# Patient Record
Sex: FEMALE | Race: WHITE | NOT HISPANIC OR LATINO | Employment: OTHER | URBAN - NONMETROPOLITAN AREA
[De-identification: names, ages, dates, MRNs, and addresses within clinical notes are randomized per-mention and may not be internally consistent; named-entity substitution may affect disease eponyms.]

---

## 2023-05-01 ENCOUNTER — APPOINTMENT (OUTPATIENT)
Dept: PRIMARY CARE | Facility: CLINIC | Age: 63
End: 2023-05-01
Payer: COMMERCIAL

## 2023-05-03 ENCOUNTER — APPOINTMENT (OUTPATIENT)
Dept: PRIMARY CARE | Facility: CLINIC | Age: 63
End: 2023-05-03
Payer: COMMERCIAL

## 2023-05-06 LAB
ALANINE AMINOTRANSFERASE (SGPT) (U/L) IN SER/PLAS: 17 U/L (ref 7–45)
ALBUMIN (G/DL) IN SER/PLAS: 4.4 G/DL (ref 3.4–5)
ALKALINE PHOSPHATASE (U/L) IN SER/PLAS: 69 U/L (ref 33–136)
ANION GAP IN SER/PLAS: 9 MMOL/L (ref 10–20)
ASPARTATE AMINOTRANSFERASE (SGOT) (U/L) IN SER/PLAS: 18 U/L (ref 9–39)
BASOPHILS (10*3/UL) IN BLOOD BY AUTOMATED COUNT: 0.12 X10E9/L (ref 0–0.1)
BASOPHILS/100 LEUKOCYTES IN BLOOD BY AUTOMATED COUNT: 2 % (ref 0–2)
BILIRUBIN TOTAL (MG/DL) IN SER/PLAS: 0.5 MG/DL (ref 0–1.2)
CALCIUM (MG/DL) IN SER/PLAS: 9.2 MG/DL (ref 8.6–10.3)
CARBON DIOXIDE, TOTAL (MMOL/L) IN SER/PLAS: 31 MMOL/L (ref 21–32)
CHLORIDE (MMOL/L) IN SER/PLAS: 106 MMOL/L (ref 98–107)
CHOLESTEROL (MG/DL) IN SER/PLAS: 216 MG/DL (ref 0–199)
CHOLESTEROL IN HDL (MG/DL) IN SER/PLAS: 67 MG/DL
CHOLESTEROL/HDL RATIO: 3.2
CREATININE (MG/DL) IN SER/PLAS: 0.8 MG/DL (ref 0.5–1.05)
EOSINOPHILS (10*3/UL) IN BLOOD BY AUTOMATED COUNT: 0.1 X10E9/L (ref 0–0.7)
EOSINOPHILS/100 LEUKOCYTES IN BLOOD BY AUTOMATED COUNT: 1.7 % (ref 0–6)
ERYTHROCYTE DISTRIBUTION WIDTH (RATIO) BY AUTOMATED COUNT: 13.6 % (ref 11.5–14.5)
ERYTHROCYTE MEAN CORPUSCULAR HEMOGLOBIN CONCENTRATION (G/DL) BY AUTOMATED: 33.4 G/DL (ref 32–36)
ERYTHROCYTE MEAN CORPUSCULAR VOLUME (FL) BY AUTOMATED COUNT: 88 FL (ref 80–100)
ERYTHROCYTES (10*6/UL) IN BLOOD BY AUTOMATED COUNT: 5 X10E12/L (ref 4–5.2)
GFR FEMALE: 83 ML/MIN/1.73M2
GLUCOSE (MG/DL) IN SER/PLAS: 78 MG/DL (ref 74–99)
HEMATOCRIT (%) IN BLOOD BY AUTOMATED COUNT: 44 % (ref 36–46)
HEMOGLOBIN (G/DL) IN BLOOD: 14.7 G/DL (ref 12–16)
IMMATURE GRANULOCYTES/100 LEUKOCYTES IN BLOOD BY AUTOMATED COUNT: 0.2 % (ref 0–0.9)
LDL: 133 MG/DL (ref 0–99)
LEUKOCYTES (10*3/UL) IN BLOOD BY AUTOMATED COUNT: 5.9 X10E9/L (ref 4.4–11.3)
LYMPHOCYTES (10*3/UL) IN BLOOD BY AUTOMATED COUNT: 0.96 X10E9/L (ref 1.2–4.8)
LYMPHOCYTES/100 LEUKOCYTES IN BLOOD BY AUTOMATED COUNT: 16.3 % (ref 13–44)
MONOCYTES (10*3/UL) IN BLOOD BY AUTOMATED COUNT: 0.41 X10E9/L (ref 0.1–1)
MONOCYTES/100 LEUKOCYTES IN BLOOD BY AUTOMATED COUNT: 6.9 % (ref 2–10)
NEUTROPHILS (10*3/UL) IN BLOOD BY AUTOMATED COUNT: 4.3 X10E9/L (ref 1.2–7.7)
NEUTROPHILS/100 LEUKOCYTES IN BLOOD BY AUTOMATED COUNT: 72.9 % (ref 40–80)
PLATELETS (10*3/UL) IN BLOOD AUTOMATED COUNT: 171 X10E9/L (ref 150–450)
POTASSIUM (MMOL/L) IN SER/PLAS: 3.5 MMOL/L (ref 3.5–5.3)
PROTEIN TOTAL: 6.8 G/DL (ref 6.4–8.2)
SODIUM (MMOL/L) IN SER/PLAS: 142 MMOL/L (ref 136–145)
TRIGLYCERIDE (MG/DL) IN SER/PLAS: 82 MG/DL (ref 0–149)
UREA NITROGEN (MG/DL) IN SER/PLAS: 20 MG/DL (ref 6–23)
VLDL: 16 MG/DL (ref 0–40)

## 2023-05-10 ENCOUNTER — OFFICE VISIT (OUTPATIENT)
Dept: PRIMARY CARE | Facility: CLINIC | Age: 63
End: 2023-05-10
Payer: COMMERCIAL

## 2023-05-10 VITALS
WEIGHT: 134 LBS | HEART RATE: 66 BPM | DIASTOLIC BLOOD PRESSURE: 72 MMHG | OXYGEN SATURATION: 98 % | BODY MASS INDEX: 21.53 KG/M2 | HEIGHT: 66 IN | SYSTOLIC BLOOD PRESSURE: 120 MMHG

## 2023-05-10 DIAGNOSIS — D32.9 MENINGIOMA (MULTI): ICD-10-CM

## 2023-05-10 DIAGNOSIS — E78.2 MODERATE MIXED HYPERLIPIDEMIA NOT REQUIRING STATIN THERAPY: ICD-10-CM

## 2023-05-10 DIAGNOSIS — M79.642 HAND PAIN, LEFT: Primary | ICD-10-CM

## 2023-05-10 DIAGNOSIS — I10 PRIMARY HYPERTENSION: ICD-10-CM

## 2023-05-10 DIAGNOSIS — F41.9 ANXIETY: ICD-10-CM

## 2023-05-10 PROBLEM — N81.9 PROLAPSE OF FEMALE PELVIC ORGANS: Status: ACTIVE | Noted: 2023-05-10

## 2023-05-10 PROBLEM — N39.0 RECURRENT UTI: Status: ACTIVE | Noted: 2023-05-10

## 2023-05-10 PROBLEM — I34.0 MITRAL REGURGITATION: Status: ACTIVE | Noted: 2023-05-10

## 2023-05-10 PROBLEM — H91.93 BILATERAL HEARING LOSS: Status: ACTIVE | Noted: 2023-05-10

## 2023-05-10 PROBLEM — I77.819 AORTIC ECTASIA (CMS-HCC): Status: ACTIVE | Noted: 2023-05-10

## 2023-05-10 PROBLEM — Z90.710 HISTORY OF HYSTERECTOMY: Status: ACTIVE | Noted: 2023-05-10

## 2023-05-10 PROBLEM — M54.2 CERVICALGIA: Status: ACTIVE | Noted: 2023-05-10

## 2023-05-10 PROCEDURE — 1036F TOBACCO NON-USER: CPT | Performed by: FAMILY MEDICINE

## 2023-05-10 PROCEDURE — 3074F SYST BP LT 130 MM HG: CPT | Performed by: FAMILY MEDICINE

## 2023-05-10 PROCEDURE — 3078F DIAST BP <80 MM HG: CPT | Performed by: FAMILY MEDICINE

## 2023-05-10 PROCEDURE — 99214 OFFICE O/P EST MOD 30 MIN: CPT | Performed by: FAMILY MEDICINE

## 2023-05-10 RX ORDER — PERPHENAZINE 4 MG
TABLET ORAL
COMMUNITY

## 2023-05-10 RX ORDER — CALCIUM CARBONATE 600 MG
TABLET ORAL
COMMUNITY
End: 2024-05-14 | Stop reason: ALTCHOICE

## 2023-05-10 RX ORDER — ESTRADIOL 0.1 MG/G
2 CREAM VAGINAL DAILY
COMMUNITY
Start: 2019-08-18 | End: 2024-01-12 | Stop reason: SDUPTHER

## 2023-05-10 RX ORDER — DEXAMETHASONE 4 MG/1
2 TABLET ORAL 2 TIMES DAILY
COMMUNITY
End: 2023-07-17

## 2023-05-10 RX ORDER — MULTIVITAMIN
TABLET ORAL
COMMUNITY

## 2023-05-10 RX ORDER — GINSENG 100 MG
CAPSULE ORAL
COMMUNITY

## 2023-05-10 RX ORDER — MV-MN/C/THEANINE/HERB NO.310 1000-200MG
POWDER IN PACKET (EA) ORAL
COMMUNITY

## 2023-05-10 NOTE — PROGRESS NOTES
"Subjective   Patient ID: Mayra Mcclure is a 62 y.o. female who presents yearly check up; lab and med review; c/o left hand pain.      HPI    Review of Systems    Objective   Vitals:    05/10/23 0815   BP: 120/72   BP Location: Left arm   Pulse: 66   SpO2: 98%   Weight: 60.8 kg (134 lb)   Height: 1.676 m (5' 6\")      Physical Exam    Assessment/Plan   There are no diagnoses linked to this encounter.      "

## 2023-05-10 NOTE — PATIENT INSTRUCTIONS
Will check xray of joint in question at thumb, consider hand referral.  Call concerns . Routine followup annually, labs prior.

## 2023-05-10 NOTE — PROGRESS NOTES
Patient presents for periodic surveillance of chronic medical problems.     Subjective  Mayra Mcclure is a 62 y.o. female who presents for No chief complaint on file..  HPI  Has had left hand pain for a year .  Using voltaren gel and otc analgesics, pain is base of thumb, worse with writing.  Left hand dominant.   Has been to neurosurg and has MRI in a year to recheck meningioma.   Has been to cardiology for mitral regurg/aortic ectasia  Htn, stable  Anxiety stable  Chronic neck pain, uses muscle relaxant prn, reviewed side effects  Mammogram and colon cancer screening current.  Denies other problems or concerns.  Review of Systems   All other systems reviewed and are negative.  .    Objective     Visit Vitals  /72 (BP Location: Left arm)   Pulse 66      Physical Exam  Vitals and nursing note reviewed.   Constitutional:       General: She is not in acute distress.     Appearance: Normal appearance. She is not toxic-appearing.   HENT:      Head: Normocephalic and atraumatic.      Right Ear: External ear normal.   Cardiovascular:      Rate and Rhythm: Normal rate and regular rhythm.      Heart sounds: No murmur heard.  Pulmonary:      Effort: Pulmonary effort is normal.      Breath sounds: Normal breath sounds.   Musculoskeletal:      Cervical back: No rigidity.      Comments: Normal gait   Skin:     General: Skin is warm and dry.   Neurological:      General: No focal deficit present.      Mental Status: She is alert and oriented to person, place, and time.   Psychiatric:         Mood and Affect: Mood normal.         Behavior: Behavior normal.         Assessment/Plan   Problem List Items Addressed This Visit          Nervous    Meningioma (CMS/HCC)       Circulatory    Hypertension    Relevant Orders    CBC and Auto Differential    Comprehensive Metabolic Panel    Lipid Panel    TSH with reflex to Free T4 if abnormal       Other    Anxiety     Other Visit Diagnoses       Hand pain, left    -  Primary     Relevant Orders    XR hand left 3+ views    Moderate mixed hyperlipidemia not requiring statin therapy        Relevant Orders    CBC and Auto Differential    Comprehensive Metabolic Panel    Lipid Panel    TSH with reflex to Free T4 if abnormal                   Bee Valenzuela MD

## 2023-06-02 DIAGNOSIS — Z00.00 ENCOUNTER FOR GENERAL ADULT MEDICAL EXAMINATION WITHOUT ABNORMAL FINDINGS: ICD-10-CM

## 2023-06-02 RX ORDER — DILTIAZEM HYDROCHLORIDE 60 MG/1
60 TABLET, FILM COATED ORAL 2 TIMES DAILY
Qty: 180 TABLET | Refills: 1 | Status: SHIPPED | OUTPATIENT
Start: 2023-06-02 | End: 2023-09-07 | Stop reason: SDUPTHER

## 2023-07-01 LAB
APPEARANCE, URINE: CLEAR
BILIRUBIN, URINE: NEGATIVE
BLOOD, URINE: NEGATIVE
COLOR, URINE: YELLOW
GLUCOSE, URINE: NEGATIVE MG/DL
KETONES, URINE: NEGATIVE MG/DL
LEUKOCYTE ESTERASE, URINE: NEGATIVE
NITRITE, URINE: NEGATIVE
PH, URINE: 5 (ref 5–8)
PROTEIN, URINE: NEGATIVE MG/DL
SPECIFIC GRAVITY, URINE: 1.02 (ref 1–1.03)
UROBILINOGEN, URINE: <2 MG/DL (ref 0–1.9)

## 2023-07-12 DIAGNOSIS — Z00.00 ENCOUNTER FOR GENERAL ADULT MEDICAL EXAMINATION WITHOUT ABNORMAL FINDINGS: ICD-10-CM

## 2023-07-17 RX ORDER — DEXAMETHASONE 4 MG/1
TABLET ORAL
Qty: 12 G | Refills: 3 | Status: SHIPPED | OUTPATIENT
Start: 2023-07-17 | End: 2024-01-12 | Stop reason: SDUPTHER

## 2023-09-07 DIAGNOSIS — Z86.59 PERSONAL HISTORY OF OTHER MENTAL AND BEHAVIORAL DISORDERS: ICD-10-CM

## 2023-09-07 DIAGNOSIS — Z00.00 ENCOUNTER FOR GENERAL ADULT MEDICAL EXAMINATION WITHOUT ABNORMAL FINDINGS: ICD-10-CM

## 2023-09-07 RX ORDER — DILTIAZEM HYDROCHLORIDE 60 MG/1
60 TABLET, FILM COATED ORAL 2 TIMES DAILY
Qty: 180 TABLET | Refills: 1 | Status: SHIPPED | OUTPATIENT
Start: 2023-09-07 | End: 2024-02-12 | Stop reason: SDUPTHER

## 2023-09-07 RX ORDER — SERTRALINE HYDROCHLORIDE 100 MG/1
100 TABLET, FILM COATED ORAL DAILY
Qty: 90 TABLET | Refills: 1 | Status: SHIPPED | OUTPATIENT
Start: 2023-09-07 | End: 2024-01-12 | Stop reason: SDUPTHER

## 2023-10-30 ENCOUNTER — PHARMACY VISIT (OUTPATIENT)
Dept: PHARMACY | Facility: CLINIC | Age: 63
End: 2023-10-30
Payer: COMMERCIAL

## 2023-10-30 PROCEDURE — RXMED WILLOW AMBULATORY MEDICATION CHARGE

## 2023-11-01 NOTE — PROGRESS NOTES
Mayra Mcclure female   1960 62 y.o.   [id number]      Chief Complaint  Annual mammogram and exam    History Of Present Illness  Araceli Mcclure is a 62 y.o.  female [facility] and exam. She had a right breast core biopsy in 2022, benign and discordant. It was unclear if the clip correlates with the original mammogram finding.     BREAST IMAGIN2023 Right diagnostic mammogram and ultrasound, indicates BI-RADS Category 2.     REPRODUCTIVE HISTORY: menarche age 11, , first birth age, , no OCP's, menopause age 60, HRT, heterogeneously dense tissue     FAMILY CANCER HISTORY:   None    Surgical History  She has a past surgical history that includes Other surgical history (2019); Other surgical history (2019); Other surgical history (2019); Other surgical history (2019); and Other surgical history (2019).     Social History  She reports that she has never smoked. She has never used smokeless tobacco. No history on file for alcohol use and drug use.    Family History  No family history on file.     Allergies  Patient has no known allergies.    Medications  Current Outpatient Medications   Medication Instructions    calcium carbonate 600 mg calcium (1,500 mg) tablet oral    coffee xt-phosphatidyl serine (Neuriva Original) 100-100 mg capsule oral    collagen-biotin-ascorbic acid (Collagen 1500 Plus C) 500 mg-800 mcg- 50 mg capsule oral    cranberry extract 200 mg capsule oral    dilTIAZem (CARDIZEM) 60 mg, oral, 2 times daily    estradiol (ESTRACE) 2 g, vaginal, Daily    fish oil (Omega-3) 60- mg capsule Fish Oil OIL   Refills: 0       Active    Flovent  mcg/actuation inhaler TAKE 2 PUFFS BY MOUTH TWICE A DAY    multivitamin tablet oral    orphenadrine (Norflex) 100 mg 12 hr tablet TAKE 1 TABLET BY MOUTH TWICE A DAY AS NEEDED FOR SPASMS    sertraline (Zoloft) 100 mg tablet TAKE 1 TABLET (100 MG)  BY MOUTH ONCE DAILY.    sertraline (ZOLOFT) 100 mg, oral, Daily         REVIEW OF SYSTEMS    Constitutional:  Negative for appetite change, fatigue, fever and unexpected weight change.   HENT:  Negative for ear pain, hearing loss, nosebleeds, sore throat and trouble swallowing.    Eyes:  Negative for discharge, itching and visual disturbance.   Respiratory:  Negative for cough, chest tightness and shortness of breath.    Cardiovascular:  Negative for chest pain, palpitations and leg swelling.   Breast: as indicated in HPI  Gastrointestinal:  Negative for abdominal pain, constipation, diarrhea and nausea.   Endocrine: Negative for cold intolerance and heat intolerance.   Genitourinary:  Negative for dysuria, frequency, hematuria, pelvic pain and vaginal bleeding.   Musculoskeletal:  Negative for arthralgias, back pain, gait problem, joint swelling and myalgias.   Skin:  Negative for color change and rash.   Allergic/Immunologic: Negative for environmental allergies and food allergies.   Neurological:  Negative for dizziness, tremors, speech difficulty, weakness, numbness and headaches.   Hematological:  Does not bruise/bleed easily.   Psychiatric/Behavioral:  Negative for agitation, dysphoric mood and sleep disturbance. The patient is not nervous/anxious.         Past Medical History  She has a past medical history of Anxiety disorder, unspecified (01/06/2023), Cervicalgia (01/06/2023), Encounter for full-term uncomplicated delivery, Essential (primary) hypertension (01/06/2023), Personal history of other mental and behavioral disorders (05/02/2022), and Unspecified hearing loss, bilateral (05/02/2022).     Physical Exam  Patient is alert and oriented x3 and in a relaxed and appropriate mood. Her gait is steady and hand grasps are equal. Sclera is clear. The breasts are nearly symmetrical. The tissue is soft without palpable abnormalities, discrete nodules or masses. The skin and nipples appear normal. There is no  cervical, supraclavicular or axillary lymphadenopathy. Heart rate and rhythm normal, S1 and S2 appreciated. The lungs are clear to auscultation bilaterally. Abdomen is soft and non-tender.       Physical Exam     Last Recorded Vitals  There were no vitals filed for this visit.    Relevant Results   Time was spent viewing digital images of the radiology testing with the patient. I explained the results in depth, along with suggested explanation for follow up recommendations based on the testing results. BI-RADS Category ***    Imaging      Assessment/Plan   Normal clinical exam and imaging, history of right breast benign biopsy, no family history of breast cancer, heterogeneously dense tissue    Plan: Return to PCP for annual mammograms.     Patient Discussion/Summary  Your clinical examination and imaging are normal. You no longer need to be seen by a breast specialist for an annual physical breast examination. It is important to continue annual screening mammograms and breast exams through your primary care provider. Please return to see me if you have a new breast problem or abnormal mammogram. It has been a pleasure having you as a patient.     You can see your health information, review clinical summaries from office visits & test results online when you follow your health with MY  Chart, a personal health record. To sign up go to [email or web address]. If you need assistance with signing up or trouble getting into your account call [facility] 24/7 at [phone].    My office phone number is [phone] if you need to get in touch with me or have additional questions or concerns. Thank you for choosing [facility] and trusting me as your healthcare provider. I am honored to be a provider on your health care team and I remain dedicated to helping you achieve your health goals.   Jennifer Forbes, APRN-CNP

## 2023-11-10 RX ORDER — ARTIFICIAL TEARS 1; 2; 3 MG/ML; MG/ML; MG/ML
SOLUTION/ DROPS OPHTHALMIC 4 TIMES DAILY
COMMUNITY

## 2023-11-10 RX ORDER — POLYETHYLENE GLYCOL 3350 17 G/17G
17 POWDER, FOR SOLUTION ORAL
COMMUNITY
Start: 2022-02-08

## 2023-11-10 RX ORDER — AMOXICILLIN 250 MG/1
1 CAPSULE ORAL 2 TIMES DAILY
COMMUNITY
Start: 2023-04-08 | End: 2024-01-12 | Stop reason: ALTCHOICE

## 2023-11-10 RX ORDER — ACETAMINOPHEN 500 MG
1 TABLET ORAL DAILY
COMMUNITY

## 2023-11-10 RX ORDER — GLUCOSAMINE/CHONDRO SU A 500-400 MG
1 TABLET ORAL 3 TIMES DAILY
COMMUNITY

## 2023-11-10 RX ORDER — IBUPROFEN 600 MG/1
600 TABLET ORAL 3 TIMES DAILY PRN
COMMUNITY
Start: 2023-07-12 | End: 2024-05-14 | Stop reason: ALTCHOICE

## 2023-11-10 RX ORDER — COVID-19 ANTIGEN TEST
KIT MISCELLANEOUS
COMMUNITY
Start: 2023-09-06 | End: 2024-05-14 | Stop reason: ALTCHOICE

## 2023-11-13 ENCOUNTER — APPOINTMENT (OUTPATIENT)
Dept: RADIOLOGY | Facility: CLINIC | Age: 63
End: 2023-11-13
Payer: COMMERCIAL

## 2023-11-13 ENCOUNTER — APPOINTMENT (OUTPATIENT)
Dept: SURGICAL ONCOLOGY | Facility: CLINIC | Age: 63
End: 2023-11-13
Payer: COMMERCIAL

## 2023-11-24 ENCOUNTER — PHARMACY VISIT (OUTPATIENT)
Dept: PHARMACY | Facility: CLINIC | Age: 63
End: 2023-11-24
Payer: COMMERCIAL

## 2023-11-24 PROCEDURE — RXMED WILLOW AMBULATORY MEDICATION CHARGE

## 2023-12-27 PROCEDURE — RXMED WILLOW AMBULATORY MEDICATION CHARGE

## 2023-12-28 ENCOUNTER — PHARMACY VISIT (OUTPATIENT)
Dept: PHARMACY | Facility: CLINIC | Age: 63
End: 2023-12-28
Payer: COMMERCIAL

## 2024-01-02 ENCOUNTER — TELEPHONE (OUTPATIENT)
Dept: PRIMARY CARE | Facility: CLINIC | Age: 64
End: 2024-01-02
Payer: COMMERCIAL

## 2024-01-02 NOTE — TELEPHONE ENCOUNTER
"KELLY stating she is going on a \"viking cruise\" in Feb. 2024; has a medical clearance form that needs filled out prior to the cruise. Wonders if TERRY needs to see her before filling out; states it just needs to say she is healthy and physically fit for the cruise. Please advise.  "

## 2024-01-12 ENCOUNTER — TELEPHONE (OUTPATIENT)
Dept: PRIMARY CARE | Facility: CLINIC | Age: 64
End: 2024-01-12

## 2024-01-12 ENCOUNTER — OFFICE VISIT (OUTPATIENT)
Dept: PRIMARY CARE | Facility: CLINIC | Age: 64
End: 2024-01-12
Payer: COMMERCIAL

## 2024-01-12 VITALS
HEART RATE: 61 BPM | OXYGEN SATURATION: 97 % | BODY MASS INDEX: 21.7 KG/M2 | WEIGHT: 135 LBS | DIASTOLIC BLOOD PRESSURE: 82 MMHG | SYSTOLIC BLOOD PRESSURE: 118 MMHG

## 2024-01-12 DIAGNOSIS — M79.641 BILATERAL HAND PAIN: ICD-10-CM

## 2024-01-12 DIAGNOSIS — Z00.00 ENCOUNTER FOR GENERAL ADULT MEDICAL EXAMINATION WITHOUT ABNORMAL FINDINGS: ICD-10-CM

## 2024-01-12 DIAGNOSIS — M54.2 CERVICALGIA: ICD-10-CM

## 2024-01-12 DIAGNOSIS — M79.642 BILATERAL HAND PAIN: ICD-10-CM

## 2024-01-12 DIAGNOSIS — F41.9 ANXIETY: ICD-10-CM

## 2024-01-12 DIAGNOSIS — D32.9 MENINGIOMA (MULTI): ICD-10-CM

## 2024-01-12 DIAGNOSIS — N39.0 RECURRENT UTI: Primary | ICD-10-CM

## 2024-01-12 DIAGNOSIS — R05.8 ALLERGIC COUGH: ICD-10-CM

## 2024-01-12 DIAGNOSIS — Z86.59 PERSONAL HISTORY OF OTHER MENTAL AND BEHAVIORAL DISORDERS: ICD-10-CM

## 2024-01-12 PROCEDURE — 3079F DIAST BP 80-89 MM HG: CPT | Performed by: FAMILY MEDICINE

## 2024-01-12 PROCEDURE — 99214 OFFICE O/P EST MOD 30 MIN: CPT | Performed by: FAMILY MEDICINE

## 2024-01-12 PROCEDURE — 1036F TOBACCO NON-USER: CPT | Performed by: FAMILY MEDICINE

## 2024-01-12 PROCEDURE — 3074F SYST BP LT 130 MM HG: CPT | Performed by: FAMILY MEDICINE

## 2024-01-12 RX ORDER — ALBUTEROL SULFATE 90 UG/1
2 AEROSOL, METERED RESPIRATORY (INHALATION) EVERY 4 HOURS PRN
Qty: 8 G | Refills: 5 | Status: SHIPPED | OUTPATIENT
Start: 2024-01-12 | End: 2025-01-11

## 2024-01-12 RX ORDER — SERTRALINE HYDROCHLORIDE 100 MG/1
100 TABLET, FILM COATED ORAL
Qty: 30 TABLET | Refills: 5 | Status: SHIPPED | OUTPATIENT
Start: 2024-01-12 | End: 2025-01-11

## 2024-01-12 RX ORDER — ESTRADIOL 0.1 MG/G
2 CREAM VAGINAL DAILY
Qty: 42.5 G | Status: CANCELLED | OUTPATIENT
Start: 2024-01-12

## 2024-01-12 RX ORDER — ESTRADIOL 0.1 MG/G
CREAM VAGINAL
Qty: 42.5 G | Refills: 1 | Status: SHIPPED | OUTPATIENT
Start: 2024-01-12

## 2024-01-12 RX ORDER — SERTRALINE HYDROCHLORIDE 100 MG/1
100 TABLET, FILM COATED ORAL DAILY
Qty: 90 TABLET | Refills: 1 | Status: SHIPPED | OUTPATIENT
Start: 2024-01-12 | End: 2024-01-12 | Stop reason: SDUPTHER

## 2024-01-12 RX ORDER — FLUTICASONE PROPIONATE 110 UG/1
2 AEROSOL, METERED RESPIRATORY (INHALATION) 2 TIMES DAILY
Qty: 12 G | Refills: 3 | Status: SHIPPED | OUTPATIENT
Start: 2024-01-12

## 2024-01-12 RX ORDER — ORPHENADRINE CITRATE 100 MG/1
TABLET, EXTENDED RELEASE ORAL
Qty: 30 TABLET | Refills: 0 | Status: SHIPPED | OUTPATIENT
Start: 2024-01-12

## 2024-01-12 ASSESSMENT — PATIENT HEALTH QUESTIONNAIRE - PHQ9
1. LITTLE INTEREST OR PLEASURE IN DOING THINGS: NOT AT ALL
2. FEELING DOWN, DEPRESSED OR HOPELESS: NOT AT ALL
SUM OF ALL RESPONSES TO PHQ9 QUESTIONS 1 AND 2: 0

## 2024-01-12 NOTE — PROGRESS NOTES
"     Augie Mcclure \"Araceli\" is a 63 y.o. female who presents for Annual Exam (Needs papers filled out for a cruise. ).  HPI  Araceli is planning a cruise to [facility], will have a doctor and nurse on board, but remote area without access to a lot of medical care.  Needs form completed.  She enjoys good health.  When in [place] her doctor started her on flovent for an allergic cough. She has not been using it and has done fine without it recently.   Recommend she fill it and take it on the cruise with her and an albuterol rescue inhaler as a precaution.  Recurrent utis, on topical vaginal estrace and needs refill  Anxiety on sertraline needs refill  Ortho gave her voltaren gel for her hands, needs refill.  Uses orphenadrine prn for chronic neck pain, needs refill.  History of meningioma, can't remember who she saw and needs fup.  Advised will review her chart and get back to her.   Review of Systems   All other systems reviewed and are negative.  .    Objective     Visit Vitals  /82   Pulse 61      Physical Exam  Vitals and nursing note reviewed.   Constitutional:       General: She is not in acute distress.     Appearance: Normal appearance. She is not toxic-appearing.   HENT:      Head: Normocephalic and atraumatic.   Cardiovascular:      Rate and Rhythm: Normal rate and regular rhythm.      Heart sounds: No murmur heard.  Pulmonary:      Effort: Pulmonary effort is normal.      Breath sounds: Normal breath sounds.   Musculoskeletal:      Cervical back: Neck supple. No rigidity.      Comments:     Skin:     General: Skin is warm and dry.   Neurological:      General: No focal deficit present.      Mental Status: She is alert and oriented to person, place, and time.   Psychiatric:         Mood and Affect: Mood normal.         Behavior: Behavior normal.         Assessment/Plan   Problem List Items Addressed This Visit       Anxiety    Relevant Medications    sertraline (Zoloft) 100 mg tablet    " PT seen for SN visit and to obtain a urine sample, Patient unable to void, Attempted to straight cath and no results. Left specimen cup for family to obtain and to deliver to [facility] or her  office. Cervicalgia    Relevant Medications    orphenadrine (Norflex) 100 mg 12 hr tablet    Recurrent UTI - Primary    Relevant Medications    estradiol (Estrace) 0.01 % (0.1 mg/gram) vaginal cream     Other Visit Diagnoses       Personal history of other mental and behavioral disorders        Encounter for general adult medical examination without abnormal findings        Relevant Medications    fluticasone (Flovent HFA) 110 mcg/actuation inhaler    orphenadrine (Norflex) 100 mg 12 hr tablet    Allergic cough        Relevant Medications    albuterol (Ventolin HFA) 90 mcg/actuation inhaler    Bilateral hand pain        Relevant Medications    diclofenac sodium 1 % kit                   Bee Valenzuela MD

## 2024-02-12 DIAGNOSIS — Z00.00 ENCOUNTER FOR GENERAL ADULT MEDICAL EXAMINATION WITHOUT ABNORMAL FINDINGS: ICD-10-CM

## 2024-02-12 RX ORDER — DILTIAZEM HYDROCHLORIDE 60 MG/1
60 TABLET, FILM COATED ORAL 2 TIMES DAILY
Qty: 180 TABLET | Refills: 1 | Status: SHIPPED | OUTPATIENT
Start: 2024-02-12

## 2024-02-26 ENCOUNTER — OFFICE VISIT (OUTPATIENT)
Dept: CARDIOLOGY | Facility: CLINIC | Age: 64
End: 2024-02-26
Payer: COMMERCIAL

## 2024-02-26 VITALS
HEIGHT: 66 IN | BODY MASS INDEX: 22.18 KG/M2 | SYSTOLIC BLOOD PRESSURE: 116 MMHG | DIASTOLIC BLOOD PRESSURE: 78 MMHG | HEART RATE: 69 BPM | OXYGEN SATURATION: 96 % | WEIGHT: 138 LBS

## 2024-02-26 DIAGNOSIS — I10 HYPERTENSION, UNSPECIFIED TYPE: Primary | ICD-10-CM

## 2024-02-26 DIAGNOSIS — I77.819 AORTIC ECTASIA (CMS-HCC): ICD-10-CM

## 2024-02-26 PROCEDURE — 3078F DIAST BP <80 MM HG: CPT | Performed by: STUDENT IN AN ORGANIZED HEALTH CARE EDUCATION/TRAINING PROGRAM

## 2024-02-26 PROCEDURE — 99214 OFFICE O/P EST MOD 30 MIN: CPT | Performed by: STUDENT IN AN ORGANIZED HEALTH CARE EDUCATION/TRAINING PROGRAM

## 2024-02-26 PROCEDURE — 1036F TOBACCO NON-USER: CPT | Performed by: STUDENT IN AN ORGANIZED HEALTH CARE EDUCATION/TRAINING PROGRAM

## 2024-02-26 PROCEDURE — 93000 ELECTROCARDIOGRAM COMPLETE: CPT | Performed by: STUDENT IN AN ORGANIZED HEALTH CARE EDUCATION/TRAINING PROGRAM

## 2024-02-26 PROCEDURE — 3074F SYST BP LT 130 MM HG: CPT | Performed by: STUDENT IN AN ORGANIZED HEALTH CARE EDUCATION/TRAINING PROGRAM

## 2024-02-26 NOTE — PROGRESS NOTES
Chief Complaint   Patient presents with    1 year follow up     HPI:  I was requested by Dr. Valenzuela to evaluate this patient in consultation for cardiac assessment.    Mrs. Mayra Mcclure is a 63 y.o. year old non-smoker female patient with past medical history significant for hypertension, anxiety, and meningioma. She states that she had an episode of blurred vision and dizziness, which have since resolved and not returned. Her work up included an MRI which showed a 1.5 cm left frontal lesion, possible meningioma. She denies any chest pain, shortness of breath, syncope or abdominal pain. She performed EKG (normal sinus rhythm) and echocardiogram with normal results and normal LVEF 60% and impaired relaxation pattern consistent with hypertension. Calcium score 0.     Per chart review, the MRI from 1/20/23 shows an area of diffusion restriction and GRE blooming corresponding to a 1.5 x 1.3 x 1.0 cm extra-axial dural-based enhancing lesion within the left frontal lobe. The lesion exerts trace mass effect with sulcal effacement on the adjacent brain parenchyma.     The MRI also showed a mild degree of scattered nonspecific T2/FLAIR hyperintense signal within the periventricular and subcortical white matter. Additional punctate T2 hyperintense, FLAIR hypointense region within the left anteromedial frontal lobe may represent a prominent perivascular space versus tiny remote infarct. The intracranial flow voids are patent appearing.       I also indicated that the small meningioma was not likely to have been responsible for the diplopia. I do not think there is any evidence that this was a vertebrobasilar TIA, and I do not think the small white matter changes are indicative of prior large vessel ischemia or demyelinating disease.  did report being under a lot of stress and this seems to be the most likely etiology, but will continue to observe for symptom recurrence.     Patient returns today stating that he  is asymptomatic from the cardiovascular standpoint and that he is feeling fine. He has been compliant to the medication. He denies chest pain, shortness of breath, palpitations, leg edema, lightheadedness, headaches, fever, chills, orthopnea, paroxysmal nocturnal dyspnea or syncope.        Past Medical History  Past Medical History:   Diagnosis Date    Anxiety disorder, unspecified 01/06/2023    Anxiety    Cervicalgia 01/06/2023    Cervicalgia    Encounter for full-term uncomplicated delivery     Normal vaginal delivery    Essential (primary) hypertension 01/06/2023    Hypertension    Personal history of other mental and behavioral disorders 05/02/2022    History of depression    Unspecified hearing loss, bilateral 05/02/2022    Bilateral hearing loss       Past Surgical History  Past Surgical History:   Procedure Laterality Date    OTHER SURGICAL HISTORY  11/13/2019    Colonoscopy    OTHER SURGICAL HISTORY  11/13/2019    Tonsillectomy    OTHER SURGICAL HISTORY  11/13/2019    Hysterectomy    OTHER SURGICAL HISTORY  11/13/2019    Cystocele repair    OTHER SURGICAL HISTORY  11/13/2019    Hernia repair       Past Family History  Family History   Problem Relation Name Age of Onset    Hypertension Mother      Cancer Mother      Uterine cancer Mother      Hearing loss Father      Other (Parkinson disease) Father          Symptomatic    Thyroid disease Sister      Stroke Other Grandparent        Allergy History  No Known Allergies    Past Social History  Social History     Socioeconomic History    Marital status:      Spouse name: None    Number of children: None    Years of education: None    Highest education level: None   Occupational History    None   Tobacco Use    Smoking status: Never    Smokeless tobacco: Never   Substance and Sexual Activity    Alcohol use: Yes    Drug use: Never    Sexual activity: None   Other Topics Concern    None   Social History Narrative    None     Social Determinants of Health  "    Financial Resource Strain: Not on file   Food Insecurity: Not on file   Transportation Needs: Not on file   Physical Activity: Not on file   Stress: Not on file   Social Connections: Not on file   Intimate Partner Violence: Not on file   Housing Stability: Not on file       Social History     Tobacco Use   Smoking Status Never   Smokeless Tobacco Never       Review of Systems:  Constitutional: as noted in HPI.      Cardiovascular: as noted in HPI.      Neurological: as noted in HPI.     Objective Data:  Last Recorded Vitals:  Vitals:    02/26/24 1318   BP: 116/78   Pulse: 69   SpO2: 96%   Weight: 62.6 kg (138 lb)   Height: 1.676 m (5' 6\")       Last Labs:  CBC - 5/6/2023:  8:31 AM  5.9 14.7 171    44.0      CMP - 5/6/2023:  8:31 AM  9.2 6.8 18 --- 0.5   _ 4.4 17 69      PTT - No results in last year.  _   _ _     VLDL   Date/Time Value Ref Range Status   05/06/2023 08:31 AM 16 0 - 40 mg/dL Final   03/21/2022 06:00 AM 20 0 - 40 mg/dL Final   05/29/2021 07:23 AM 15 0 - 40 mg/dL Final        Patient Medications:  Outpatient Encounter Medications as of 2/26/2024   Medication Sig Dispense Refill    albuterol (Ventolin HFA) 90 mcg/actuation inhaler Inhale 2 puffs every 4 hours if needed for wheezing or shortness of breath. 8 g 5    artificial tears, dextran-hypomel-glycerin, 0.1-0.3-0.2 % ophthalmic solution Administer into affected eye(s) 4 times a day.      calcium carbonate-vitamin D3 600 mg-20 mcg (800 unit) tablet Take 1 tablet by mouth once daily.      coffee xt-phosphatidyl serine (Neuriva Original) 100-100 mg capsule Take by mouth.      collagen-biotin-ascorbic acid (Collagen 1500 Plus C) 500 mg-800 mcg- 50 mg capsule Take by mouth.      cranberry extract 200 mg capsule Take by mouth.      diclofenac sodium 1 % kit Apply 1 Application topically 2 times a day. 100 each 1    dilTIAZem (Cardizem) 60 mg immediate release tablet Take 1 tablet (60 mg) by mouth 2 times a day. 180 tablet 1    estradiol (Estrace) 0.01 " % (0.1 mg/gram) vaginal cream One toshia vaginally three times week 42.5 g 1    fish oil (Omega-3) 60- mg capsule Fish Oil OIL   Refills: 0       Active      glucosamine-chondroitin 500-400 mg tablet Take 1 tablet by mouth 3 times a day. Take per directed      ibuprofen 600 mg tablet Take 1 tablet (600 mg) by mouth 3 times a day as needed. Take with food.      multivitamin tablet Take by mouth.      orphenadrine (Norflex) 100 mg 12 hr tablet TAKE 1 TABLET BY MOUTH TWICE A DAY AS NEEDED FOR SPASMS 30 tablet 0    polyethylene glycol (Miralax) 17 gram/dose powder Take 17 g by mouth. Take per directed      sertraline (Zoloft) 100 mg tablet TAKE 1 TABLET (100 MG) BY MOUTH ONCE DAILY. 30 tablet 5    tetrahydrozoline (Visine) 0.05 % ophthalmic solution Administer into affected eye(s) 4 times a day.      aspirin-acetaminophen-caffeine (Excedrin Migraine) 250-250-65 mg tablet Take 1 tablet by mouth once daily.      calcium carbonate 600 mg calcium (1,500 mg) tablet Take by mouth.      Flowflex COVID-19 Ag Home Test kit Take per directed      fluticasone (Flovent HFA) 110 mcg/actuation inhaler Inhale 2 puffs 2 times a day. Rinse mouth with water after use to reduce aftertaste and incidence of candidiasis. Do not swallow. (Patient not taking: Reported on 2/26/2024) 12 g 3     No facility-administered encounter medications on file as of 2/26/2024.       Physical Exam:  Constitutional: alert and in no acute distress.   Neck: neck is supple, symmetric, trachea midline, no masses  and no thyromegaly   Pulmonary: no increased work of breathing or signs of respiratory distress , lungs clear to auscultation. , normal percussion of chest  and chest palpation normal .   Cardiovascular: carotid pulses 2+ bilaterally with no bruit , JVP was normal, no thrills , regular rhythm, normal S1 and S2, no murmurs , pedal pulses 2+ bilaterally  and no edema .   Abdomen: abdomen non-tender, no masses .   Musculoskeletal: normal gait.    Psychiatric normal mood and affect .    Past Cardiology Results (Last 3 Years):  EKG:  No results found for this or any previous visit from the past 1095 days.    Echo:  Echo Results:  No results found for this or any previous visit from the past 365 days.     Cath:  No results found for this or any previous visit from the past 1095 days.    CV NCDR CATHPCI V5 COLLECTION FORM   Stress Test:  No results found for this or any previous visit from the past 1095 days.    Cardiac Imaging:  No results found for this or any previous visit from the past 1095 days.       Assessment/Plan   In summary, this is a 64 yo non-smoker woman with prior medical history significant for hypertension, anxiety, and meningioma. She states that she had an episode of blurred vision and dizziness, which have since resolved and not returned. Her work up included an MRI which showed a 1.5 cm left frontal lesion, possible meningioma.      # Hypertension  - I have personally reviewed the EKG (normal findings) and the echo images. My impression is that the anatomy is normal with LVEF 60% and impaired relaxation pattern consistent with hypertension.  - CT Calcium score 0.  - It is very unlikely that the diplopia was caused by cardioembolic features. Meningioma is most likely at this point. We will not request YULY at this moment.  - Will remain on Diltiazem.   - Will have a follow up with our team in 1 year for check up.     # Anxiety  - We have discussed the implications of anxiety on cardiovascular diseases  - Will remain with Sertraline at this point      # Meningioma  - The findings suggest that this most likely represents a meningioma, and in light of the small size and lack of significant edema or brain compression would recommend observation. Based on the Neurosurgical assessment, the plan will be for a short interval scan in three months to assess for stability and help confirm the diagnosis and then a repeat scan in one year. If stable  could continue observation, but if there is evidence of growth would recommend excision.     We have discussed the most common side effects of the prescribed medications, indications, drug interactions, risks, complications, and alternatives of medications/therapeutics were explained and discussed. The patient has been requested to monitor closely for any untoward side effects or complications of medications. The patient has been strongly advised to be compliant with the recommendations, all the questions and concerns have been addressed. The patient has been also instructed to call, to return sooner or to go to the emergency department if symptoms persist or get worsen. The patient voiced understanding and denies any further questions at this time.     This note was transcribed using the Dragon Dictation system. There may be grammatical, punctuation, or verbiage errors that occur with voice recognition programs.     Thank you, Dr. Valenzuela, for allowing me to participate in the care of this patient. Please reach me out if you have any questions or if you need any clarifications regarding the patient's care.      Ernie Flanagan MD  Cardiology

## 2024-03-15 ENCOUNTER — TELEPHONE (OUTPATIENT)
Dept: PRIMARY CARE | Facility: CLINIC | Age: 64
End: 2024-03-15
Payer: COMMERCIAL

## 2024-03-15 NOTE — TELEPHONE ENCOUNTER
Pt recently did some traveling and yesterday tested positive for COVID at home. Originally spoke with pt approx 8:20am today and offered an apt with TERRY; pt declined. Pt called back at 11:40am stating she changed her mind and wanted seen. I advised PANCHITOMCKENZIE was seeing her last pt of the day as she only works until noon on Friday. I advised earlier in the day we did have openings and could have got the pt in but she declined an apt at that time. Pt states understanding of this. I advised if she felt worse over the weekend she could go to urgent care. Advised if she had any SOB or difficulty breathing to go to ER; states understanding of all this.  FYI.

## 2024-03-16 ENCOUNTER — OFFICE VISIT (OUTPATIENT)
Dept: URGENT CARE | Facility: CLINIC | Age: 64
End: 2024-03-16
Payer: COMMERCIAL

## 2024-03-16 ENCOUNTER — PHARMACY VISIT (OUTPATIENT)
Dept: PHARMACY | Facility: CLINIC | Age: 64
End: 2024-03-16
Payer: COMMERCIAL

## 2024-03-16 VITALS
DIASTOLIC BLOOD PRESSURE: 84 MMHG | OXYGEN SATURATION: 95 % | TEMPERATURE: 98.1 F | HEIGHT: 66 IN | WEIGHT: 140 LBS | SYSTOLIC BLOOD PRESSURE: 126 MMHG | RESPIRATION RATE: 16 BRPM | HEART RATE: 77 BPM | BODY MASS INDEX: 22.5 KG/M2

## 2024-03-16 DIAGNOSIS — U07.1 COVID-19: Primary | ICD-10-CM

## 2024-03-16 PROCEDURE — RXMED WILLOW AMBULATORY MEDICATION CHARGE

## 2024-03-16 PROCEDURE — 99213 OFFICE O/P EST LOW 20 MIN: CPT | Performed by: NURSE PRACTITIONER

## 2024-03-16 RX ORDER — BENZONATATE 100 MG/1
100-200 CAPSULE ORAL EVERY 8 HOURS PRN
Qty: 40 CAPSULE | Refills: 0 | Status: SHIPPED | OUTPATIENT
Start: 2024-03-16 | End: 2024-05-14 | Stop reason: ALTCHOICE

## 2024-03-16 NOTE — PROGRESS NOTES
[facility] URG[facility]  Olga Springer, APRN-CNP     Visit Note - 3/16/2024 10:58 AM   This note was generated with voice recognition software and may contain errors including spelling, grammar, syntax, and misrecognization of what was dictated.    Patient: Mayra Mcclure, MRN: [id number], 63 y.o., female   PCP: Bee Valenzuela MD  ------------------------------------  ALLERGIES: No Known Allergies     CURRENT MEDICATIONS:   Current Outpatient Medications   Medication Instructions    albuterol (Ventolin HFA) 90 mcg/actuation inhaler 2 puffs, inhalation, Every 4 hours PRN    artificial tears, dextran-hypomel-glycerin, 0.1-0.3-0.2 % ophthalmic solution ophthalmic (eye), 4 times daily    aspirin-acetaminophen-caffeine (Excedrin Migraine) 250-250-65 mg tablet 1 tablet, oral, Daily    benzonatate (TESSALON) 100-200 mg, oral, Every 8 hours PRN, Do not crush or chew.    calcium carbonate 600 mg calcium (1,500 mg) tablet oral    calcium carbonate-vitamin D3 600 mg-20 mcg (800 unit) tablet 1 tablet, oral, Daily    coffee xt-phosphatidyl serine (Neuriva Original) 100-100 mg capsule oral    collagen-biotin-ascorbic acid (Collagen 1500 Plus C) 500 mg-800 mcg- 50 mg capsule oral    cranberry extract 200 mg capsule oral    diclofenac sodium 1 % kit 1 Application, Topical, 2 times daily    dilTIAZem (CARDIZEM) 60 mg, oral, 2 times daily    estradiol (Estrace) 0.01 % (0.1 mg/gram) vaginal cream One toshia vaginally three times week    fish oil (Omega-3) 60- mg capsule Fish Oil OIL   Refills: 0       Active    Flowflex COVID-19 Ag Home Test kit Take per directed    fluticasone (Flovent HFA) 110 mcg/actuation inhaler 2 puffs, inhalation, 2 times daily, Rinse mouth with water after use to reduce aftertaste and incidence of candidiasis. Do not swallow.    glucosamine-chondroitin 500-400 mg tablet 1 tablet, oral, 3 times daily, Take per directed    ibuprofen 600 mg, oral, 3 times daily PRN, Take with food.    multivitamin  tablet oral    orphenadrine (Norflex) 100 mg 12 hr tablet TAKE 1 TABLET BY MOUTH TWICE A DAY AS NEEDED FOR SPASMS    polyethylene glycol (MIRALAX) 17 g, oral, Take per directed    sertraline (Zoloft) 100 mg tablet TAKE 1 TABLET (100 MG) BY MOUTH ONCE DAILY.    tetrahydrozoline (Visine) 0.05 % ophthalmic solution ophthalmic (eye), 4 times daily     ------------------------------------  PAST MEDICAL HX:  Patient Active Problem List   Diagnosis    Anxiety    Bilateral hearing loss    Cervicalgia    History of hysterectomy    Hypertension    Prolapse of female pelvic organs    Recurrent UTI    Mitral regurgitation    Meningioma (CMS/HCC)    Aortic ectasia (CMS/HCC)      SURGICAL HX:  Past Surgical History:   Procedure Laterality Date    OTHER SURGICAL HISTORY  11/13/2019    Colonoscopy    OTHER SURGICAL HISTORY  11/13/2019    Tonsillectomy    OTHER SURGICAL HISTORY  11/13/2019    Hysterectomy    OTHER SURGICAL HISTORY  11/13/2019    Cystocele repair    OTHER SURGICAL HISTORY  11/13/2019    Hernia repair      FAMILY HX:   No pertinent history.   SOCIAL HX:    reports that she has never smoked. She has never used smokeless tobacco. . Recently returned from a trip to [facility].   ------------------------------------  CHIEF COMPLAINT:   Chief Complaint   Patient presents with    URI     COVID POSITIVES 2 DAYS AGO JUST GOT BACK FROM [facility], FATIGUE, SINUS PRESSURE, JAW PAIN, THINKS SHE HAD A FEVER THE FIRST DAY, OTC COUGH SYRUP , IBUPROFEN ON AND OFF      HISTORY OF PRESENT ILLNESS: The history was obtained from patientOni Nunez is a 63 y.o. female, who presents with a chief complaint of a sore throat, fatigue, fevers/chills, headaches, nasal congestion (yellow mucus), sinus pressure, body aches, and a productive cough (yellow phlegm) - sxs started on Wednesday night. Reports is concerned because She took a home COVID test  on Thursday  and it was positive. Would like to discuss options for treatment. Denies any  "ear pain, chest pain, wheezing/shortness of breath, rashes, abdominal pain, urinary symptoms, diarrhea, and nausea/vomiting. Denies any lightheadedness or dizziness; no changes in mental status. No swelling in legs. Appetite is decreased  is able to eat and drink fluids without difficulty; denies loss of sense of taste/smell. Reports symptoms have have improved since onset. Has been taking  OTC cold medicine and decongestants  without much relief; no other over-the-counter medications or home remedies for symptom management. No known ill contacts but she recently returned from a trip to [facility], where she traveled on a crowded ship and a plane.. Has received this season's influenza vaccine ; reports is up to date with COVID vaccines . No known history of COVID infection. . Is not a smoker.      REVIEW OF SYSTEMS:  10 systems reviewed negative with exception of history of present illness as listed above.    TODAY'S VITALS: /84   Pulse 77   Temp 36.7 °C (98.1 °F)   Resp 16   Ht 1.676 m (5' 6\")   Wt 63.5 kg (140 lb)   SpO2 95%   BMI 22.60 kg/m²     PHYSICAL EXAMINATION:  General:  Mildly ill-appearing, well nourished female; alert and oriented; in no acute distress. Sitting comfortably on exam table. Non-dyspneic.   Eyes:  Pupils equal, round and reactive to light. No conjunctival erythema; no scleral icterus.   HENT: No frontal or maxillary sinus tenderness; + audible nasal congestion. Airway patent, TMs and ear canals clear/unremarkable bilaterally. Nasal mucosa mildly injected and edematous. Oral mucosa moist. Posterior pharynx mildly injected but without vesicles or oropharyngeal exudate aside from PND. Uvula is midline. Managing oral secretions without difficulty.  Neck:  Supple. Mildly tender, mobile anterior cervical lymphadenopathy bilat. Trachea is midline.  Respiratory:  Respirations easy and unlabored, Breath sounds equal. Lungs are clear to auscultation; no wheezes, rhonchi, or rales; has " good air movement throughout. + productive cough noted. Non-dyspneic with ambulation; able to maintain SpO2.  Cardiovascular:  Normal rate, Regular rhythm. Normal S1S2. No m/r/g. No peripheral edema.   Gastrointestinal:  Soft, non-tender, non-distended; no palpable masses or organomegaly. Bowel sounds normoactive.  Musculoskeletal:  Grossly normal; appropriate for age.     Integumentary:  Pink, warm, dry, and intact. No rashes or skin discoloration appreciated. Good skin turgor.  Neurologic:  Alert and oriented, no gross deficits.    Cognition and Speech:  Oriented, Speech clear and coherent.    Psychiatric:  Cooperative, Appropriate mood & affect.       ------------------------------------  Medical Decision Making  LABORATORY or RADIOLOGICAL IMAGING ORDERS/RESULTS:   None    IMPRESSION/PLAN:  Course: Worsening; stable    1. COVID-19  - benzonatate (Tessalon) 100 mg capsule; Take 1-2 capsules (100-200 mg) by mouth every 8 hours if needed for cough. Do not crush or chew.  Dispense: 40 capsule; Refill: 0    No red flags on exam today. She developed sxs on Wednesday, and tested positive for COVID on a home test on Thursday.. I have reviewed the  COVID-19 algorithm, and counseled pt on current CDC recommendations, including quarantine period, self care measures, and notification of recent contacts. Based on duration of symptoms, current health issues, and current medications, briefly discussed pro's/con's of Paxlovid, but recommended she contact PCP ASAP to discuss, as medication adjustments may need to be made (diltiazem) if she wishes to take the medication. Will start Tessalon Pearles for symptom relief in the meantime. Also encouraged conservative measures - instructed to push fluids, rest, and to use appropriate over the counter medications as needed for management of symptoms. Should avoid NSAIDs, but can use Tylenol PRN. Reviewed instructions for self-isolation and continued monitoring. Pt verbalized  understanding. Reviewed red flags to monitor for, counseled on potential adverse reactions of treatments, expectations for improvement in sxs, and advised to follow-up with primary care provider in 3-5 days if symptoms persist, or to seek care sooner if worsening or if any additional concerns/red flags develop.  Patient agreed with plan of care; questions were encouraged and answered.       SHAHEEN Abernathy-CNP   Advanced Practice Provider  [facility]

## 2024-03-25 ENCOUNTER — HOSPITAL ENCOUNTER (OUTPATIENT)
Dept: RADIOLOGY | Facility: HOSPITAL | Age: 64
Discharge: HOME | End: 2024-03-25
Payer: COMMERCIAL

## 2024-03-25 VITALS — HEIGHT: 66 IN | BODY MASS INDEX: 21.69 KG/M2 | WEIGHT: 135 LBS

## 2024-03-25 DIAGNOSIS — Z12.31 ENCOUNTER FOR SCREENING MAMMOGRAM FOR MALIGNANT NEOPLASM OF BREAST: ICD-10-CM

## 2024-03-25 PROCEDURE — 77067 SCR MAMMO BI INCL CAD: CPT

## 2024-03-25 PROCEDURE — 77063 BREAST TOMOSYNTHESIS BI: CPT | Performed by: RADIOLOGY

## 2024-03-25 PROCEDURE — 77067 SCR MAMMO BI INCL CAD: CPT | Performed by: RADIOLOGY

## 2024-03-26 ENCOUNTER — OFFICE VISIT (OUTPATIENT)
Dept: ORTHOPEDIC SURGERY | Facility: CLINIC | Age: 64
End: 2024-03-26
Payer: COMMERCIAL

## 2024-03-26 ENCOUNTER — HOSPITAL ENCOUNTER (OUTPATIENT)
Dept: RADIOLOGY | Facility: CLINIC | Age: 64
Discharge: HOME | End: 2024-03-26
Payer: COMMERCIAL

## 2024-03-26 DIAGNOSIS — M79.641 BILATERAL HAND PAIN: ICD-10-CM

## 2024-03-26 DIAGNOSIS — M79.642 LEFT HAND PAIN: ICD-10-CM

## 2024-03-26 DIAGNOSIS — M79.642 BILATERAL HAND PAIN: ICD-10-CM

## 2024-03-26 DIAGNOSIS — M17.11 PRIMARY OSTEOARTHRITIS OF RIGHT KNEE: ICD-10-CM

## 2024-03-26 PROCEDURE — 73130 X-RAY EXAM OF HAND: CPT | Mod: 50

## 2024-03-26 PROCEDURE — 99214 OFFICE O/P EST MOD 30 MIN: CPT | Performed by: SPECIALIST

## 2024-03-26 PROCEDURE — 76882 US LMTD JT/FCL EVL NVASC XTR: CPT | Performed by: SPECIALIST

## 2024-03-26 PROCEDURE — 20604 DRAIN/INJ JOINT/BURSA W/US: CPT | Performed by: SPECIALIST

## 2024-03-26 PROCEDURE — L3809 WHFO W/O JOINTS PRE OTS: HCPCS | Performed by: SPECIALIST

## 2024-03-26 PROCEDURE — 73130 X-RAY EXAM OF HAND: CPT | Mod: BILATERAL PROCEDURE | Performed by: RADIOLOGY

## 2024-03-26 PROCEDURE — 1036F TOBACCO NON-USER: CPT | Performed by: SPECIALIST

## 2024-03-26 RX ORDER — DICLOFENAC SODIUM 10 MG/G
4 GEL TOPICAL 4 TIMES DAILY PRN
Qty: 100 G | Refills: 1 | Status: SHIPPED | OUTPATIENT
Start: 2024-03-26 | End: 2024-05-14 | Stop reason: SDUPTHER

## 2024-03-26 NOTE — ASSESSMENT & PLAN NOTE
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    Plan:  Left thumb CMC joint dexamethasone injection.  Right thumb thumb spica splint  OT for gentle range of motion and strengthening.  I have used soft braces in the past called thumb slings.  I am hoping the occupational therapist have information about this or access to this to inform this patient.  Follow-up 3 to 4 weeks for clinical recheck.

## 2024-03-26 NOTE — PROGRESS NOTES
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    Plan:  Left thumb CMC joint dexamethasone injection.  Right thumb thumb spica splint  OT for gentle range of motion and strengthening.  I have used soft braces in the past called thumb slings.  I am hoping the occupational therapist have information about this or access to this to inform this patient.  Follow-up 3 to 4 weeks for clinical recheck.       Subjective    Patient ID: Mayra Mcclure \"Merrick" is a 63 y.o. female.    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  She got great relief from CMC joint injections.  These were last performed 9 months to a year ago.  She comes in today stating that the left thumb is more aggravated and she wishes to consider a booster shot.    OBJECTIVE: ORTHO EXAM  Left thumb  Tender at the CMC joint.  Compression range of motion is with crepitance and pain.  Distraction range of motion is without crepitance and much less painful.  Shouldering of the thumb i.e. lateral subluxation of the metacarpal base is noted.  Pinch  is painful.  And also weak.  She is neurovascularly intact distally.  After her injection many of her symptoms resolved.  She had an excellent lidocaine suppression test.    Right thumb exam  Less tender but  at the CMC joint.  Compression test with crepitation and pain with distraction test  Improvement noted.  Some shouldering of the thumb with lateral subluxation of the metacarpal base.  This was easily reducible.  Pinch  painful but less than the left " side.  Neurovascularly intact distally.    IMAGE RESULTS:  ECG 12 lead (Clinic Performed)  EKG shows normal sinus rhythm with no signs of ACS.       ULTRASOUND  Wrist Ultrasound    DIAGNOSTIC ULTRASOUND REPORT FINAL: Left HAND AND WRIST  Sonographer: Jassi Cochran MD  Procedure: Ultrasound, extremity, nonvascular, real-time, COMPLETE, anatomic specific.  Indication: Wrist Pain  Technique: B-Mode Ultrasound Examination performed using 8-13 MHz linear transducer with [employer] Software  STUDY TYPE:  1. ULTRASOUND EXTREMITY  2. REAL TIME WITH IMAGE DOCUMENTATION  3. NON-VASCULAR  4. COMPLETE STUDY  Site: LEFT HAND AND WRIST  Live ultrasound was performed with the patient's HAND AND WRIST and PERMANENTLY documented. COMPLETE and FINAL ULTRASOUND REPORT of the patient's  HAND AND WRIST. . I personally performed the ultrasound and reviewed the findings. These show:    Live ultrasound was performed of the patient's HAND AND WRIST that shows intact the CMC joint to be arthritic with lateral subluxation of the metacarpal.  The left was reducible.  There was joint space narrowing.      Wrist Ultrasound    DIAGNOSTIC ULTRASOUND REPORT FINAL: Right HAND AND WRIST  Sonographer: Jassi Cochran MD  Procedure: Ultrasound, extremity, nonvascular, real-time, COMPLETE, anatomic specific.  Indication: Wrist Pain  Technique: B-Mode Ultrasound Examination performed using 8-13 MHz linear transducer with [employer] Software  STUDY TYPE:  1. ULTRASOUND EXTREMITY  2. REAL TIME WITH IMAGE DOCUMENTATION  3. NON-VASCULAR  4. COMPLETE STUDY  Site: LEFT HAND AND WRIST  Live ultrasound was performed with the patient's HAND AND WRIST and PERMANENTLY documented. COMPLETE and FINAL ULTRASOUND REPORT of the patient's  HAND AND WRIST. . I personally performed the ultrasound and reviewed the findings. These show:    Live ultrasound was performed of the patient's HAND AND WRIST that shows arthritic changes at the CMC joint of the thumb.  Some lateral subluxation of  the metacarpal base is seen.  This was reducible.  Some but less joint space narrowing than the contralateral was noted.  S Inj/Asp: L thumb CMC on 3/26/2024 1:34 PM  Indications: pain  Details: ultrasound-guided lateral approach  Medications: 4 mg dexAMETHasone 4 mg/mL  Procedure, treatment alternatives, risks and benefits explained, specific risks discussed. Consent was given by the patient. Immediately prior to procedure a time out was called to verify the correct patient, procedure, equipment, support staff and site/side marked as required. Patient was prepped and draped in the usual sterile fashion.            Orders Placed This Encounter    XR hand left 3+ views    Point of Care Ultrasound    XR hand 3+ views bilateral    Referral to Occupational Therapy

## 2024-04-23 ENCOUNTER — APPOINTMENT (OUTPATIENT)
Dept: ORTHOPEDIC SURGERY | Facility: CLINIC | Age: 64
End: 2024-04-23
Payer: COMMERCIAL

## 2024-05-09 ENCOUNTER — LAB (OUTPATIENT)
Dept: LAB | Facility: LAB | Age: 64
End: 2024-05-09
Payer: COMMERCIAL

## 2024-05-09 DIAGNOSIS — I10 PRIMARY HYPERTENSION: ICD-10-CM

## 2024-05-09 DIAGNOSIS — E78.2 MODERATE MIXED HYPERLIPIDEMIA NOT REQUIRING STATIN THERAPY: ICD-10-CM

## 2024-05-09 LAB
ALBUMIN SERPL BCP-MCNC: 4.5 G/DL (ref 3.4–5)
ALP SERPL-CCNC: 75 U/L (ref 33–136)
ALT SERPL W P-5'-P-CCNC: 15 U/L (ref 7–45)
ANION GAP SERPL CALC-SCNC: 10 MMOL/L (ref 10–20)
AST SERPL W P-5'-P-CCNC: 18 U/L (ref 9–39)
BASOPHILS # BLD AUTO: 0.09 X10*3/UL (ref 0–0.1)
BASOPHILS NFR BLD AUTO: 2.2 %
BILIRUB SERPL-MCNC: 0.5 MG/DL (ref 0–1.2)
BUN SERPL-MCNC: 21 MG/DL (ref 6–23)
CALCIUM SERPL-MCNC: 9 MG/DL (ref 8.6–10.3)
CHLORIDE SERPL-SCNC: 105 MMOL/L (ref 98–107)
CHOLEST SERPL-MCNC: 204 MG/DL (ref 0–199)
CHOLESTEROL/HDL RATIO: 3.5
CO2 SERPL-SCNC: 29 MMOL/L (ref 21–32)
CREAT SERPL-MCNC: 0.78 MG/DL (ref 0.5–1.05)
EGFRCR SERPLBLD CKD-EPI 2021: 85 ML/MIN/1.73M*2
EOSINOPHIL # BLD AUTO: 0.09 X10*3/UL (ref 0–0.7)
EOSINOPHIL NFR BLD AUTO: 2.2 %
ERYTHROCYTE [DISTWIDTH] IN BLOOD BY AUTOMATED COUNT: 13.2 % (ref 11.5–14.5)
GLUCOSE SERPL-MCNC: 85 MG/DL (ref 74–99)
HCT VFR BLD AUTO: 43.5 % (ref 36–46)
HDLC SERPL-MCNC: 58 MG/DL
HGB BLD-MCNC: 14.6 G/DL (ref 12–16)
IMM GRANULOCYTES # BLD AUTO: 0.01 X10*3/UL (ref 0–0.7)
IMM GRANULOCYTES NFR BLD AUTO: 0.2 % (ref 0–0.9)
LDLC SERPL CALC-MCNC: 126 MG/DL
LYMPHOCYTES # BLD AUTO: 1.33 X10*3/UL (ref 1.2–4.8)
LYMPHOCYTES NFR BLD AUTO: 32.4 %
MCH RBC QN AUTO: 29.7 PG (ref 26–34)
MCHC RBC AUTO-ENTMCNC: 33.6 G/DL (ref 32–36)
MCV RBC AUTO: 89 FL (ref 80–100)
MONOCYTES # BLD AUTO: 0.49 X10*3/UL (ref 0.1–1)
MONOCYTES NFR BLD AUTO: 12 %
NEUTROPHILS # BLD AUTO: 2.09 X10*3/UL (ref 1.2–7.7)
NEUTROPHILS NFR BLD AUTO: 51 %
NON HDL CHOLESTEROL: 146 MG/DL (ref 0–149)
NRBC BLD-RTO: 0 /100 WBCS (ref 0–0)
PLATELET # BLD AUTO: 162 X10*3/UL (ref 150–450)
POTASSIUM SERPL-SCNC: 3.8 MMOL/L (ref 3.5–5.3)
PROT SERPL-MCNC: 6.3 G/DL (ref 6.4–8.2)
RBC # BLD AUTO: 4.91 X10*6/UL (ref 4–5.2)
SODIUM SERPL-SCNC: 140 MMOL/L (ref 136–145)
TRIGL SERPL-MCNC: 98 MG/DL (ref 0–149)
TSH SERPL-ACNC: 1.59 MIU/L (ref 0.44–3.98)
VLDL: 20 MG/DL (ref 0–40)
WBC # BLD AUTO: 4.1 X10*3/UL (ref 4.4–11.3)

## 2024-05-09 PROCEDURE — 85025 COMPLETE CBC W/AUTO DIFF WBC: CPT

## 2024-05-09 PROCEDURE — 84443 ASSAY THYROID STIM HORMONE: CPT

## 2024-05-09 PROCEDURE — 80053 COMPREHEN METABOLIC PANEL: CPT

## 2024-05-09 PROCEDURE — 80061 LIPID PANEL: CPT

## 2024-05-09 PROCEDURE — 36415 COLL VENOUS BLD VENIPUNCTURE: CPT

## 2024-05-14 ENCOUNTER — OFFICE VISIT (OUTPATIENT)
Dept: PRIMARY CARE | Facility: CLINIC | Age: 64
End: 2024-05-14
Payer: COMMERCIAL

## 2024-05-14 VITALS
WEIGHT: 136 LBS | SYSTOLIC BLOOD PRESSURE: 118 MMHG | HEART RATE: 64 BPM | BODY MASS INDEX: 21.86 KG/M2 | HEIGHT: 66 IN | DIASTOLIC BLOOD PRESSURE: 74 MMHG

## 2024-05-14 DIAGNOSIS — M79.645 BILATERAL THUMB PAIN: Primary | ICD-10-CM

## 2024-05-14 DIAGNOSIS — F41.9 ANXIETY: ICD-10-CM

## 2024-05-14 DIAGNOSIS — M79.644 BILATERAL THUMB PAIN: Primary | ICD-10-CM

## 2024-05-14 DIAGNOSIS — M54.2 CERVICALGIA: ICD-10-CM

## 2024-05-14 DIAGNOSIS — I10 PRIMARY HYPERTENSION: ICD-10-CM

## 2024-05-14 PROCEDURE — RXMED WILLOW AMBULATORY MEDICATION CHARGE

## 2024-05-14 PROCEDURE — 3078F DIAST BP <80 MM HG: CPT | Performed by: FAMILY MEDICINE

## 2024-05-14 PROCEDURE — 1036F TOBACCO NON-USER: CPT | Performed by: FAMILY MEDICINE

## 2024-05-14 PROCEDURE — 3074F SYST BP LT 130 MM HG: CPT | Performed by: FAMILY MEDICINE

## 2024-05-14 PROCEDURE — 99214 OFFICE O/P EST MOD 30 MIN: CPT | Performed by: FAMILY MEDICINE

## 2024-05-14 RX ORDER — MELOXICAM 15 MG/1
15 TABLET ORAL DAILY PRN
Qty: 30 TABLET | Refills: 2 | Status: SHIPPED | OUTPATIENT
Start: 2024-05-14 | End: 2025-05-14

## 2024-05-14 RX ORDER — BUTYROSPERMUM PARKII(SHEA BUTTER), SIMMONDSIA CHINENSIS (JOJOBA) SEED OIL, ALOE BARBADENSIS LEAF EXTRACT .01; 1; 3.5 G/100G; G/100G; G/100G
250 LIQUID TOPICAL 2 TIMES DAILY
COMMUNITY

## 2024-05-14 RX ORDER — MELOXICAM 15 MG/1
15 TABLET ORAL DAILY PRN
Qty: 30 TABLET | Refills: 2 | Status: SHIPPED | OUTPATIENT
Start: 2024-05-14 | End: 2024-05-14 | Stop reason: SDUPTHER

## 2024-05-14 ASSESSMENT — PATIENT HEALTH QUESTIONNAIRE - PHQ9
SUM OF ALL RESPONSES TO PHQ9 QUESTIONS 1 AND 2: 0
2. FEELING DOWN, DEPRESSED OR HOPELESS: NOT AT ALL
1. LITTLE INTEREST OR PLEASURE IN DOING THINGS: NOT AT ALL

## 2024-05-14 NOTE — PROGRESS NOTES
"Patient presents for periodic surveillance of chronic medical problems.     Subjective  Mayra Mcclure \"Araceli\" is a 63 y.o. female who presents for Annual Exam.  HPI  Htn, stable on current regimen  Anxiety, stable on current regimen, denies depressive symptoms  Bilateral hand pain, has been to ortho and had injection in her thumb, mcp of thumbs can be quite painful, recommend tylenol first line, can take daily , and meloxicam prn for bad days, reviewed side effects including kidney damage and internal bleeding  Cervicalgia, chronic, stable on current regimen  Reviewed labs,few mild abnormalities, wbc, protein which are not felt to be clinically significant at this point, will monitor  Dyslipidemia, ten year risk less than 7. 5 percent, will monitor    Review of Systems   All other systems reviewed and are negative.  .     Current Outpatient Medications:     albuterol (Ventolin HFA) 90 mcg/actuation inhaler, Inhale 2 puffs every 4 hours if needed for wheezing or shortness of breath., Disp: 8 g, Rfl: 5    artificial tears, dextran-hypomel-glycerin, 0.1-0.3-0.2 % ophthalmic solution, Administer into affected eye(s) 4 times a day., Disp: , Rfl:     calcium carbonate-vitamin D3 600 mg-20 mcg (800 unit) tablet, Take 1 tablet by mouth once daily., Disp: , Rfl:     coffee xt-phosphatidyl serine (Neuriva Original) 100-100 mg capsule, Take by mouth., Disp: , Rfl:     collagen-biotin-ascorbic acid (Collagen 1500 Plus C) 500 mg-800 mcg- 50 mg capsule, Take by mouth., Disp: , Rfl:     cranberry extract 200 mg capsule, Take by mouth., Disp: , Rfl:     diclofenac sodium 1 % kit, Apply 1 Application topically 2 times a day., Disp: 100 each, Rfl: 1    dilTIAZem (Cardizem) 60 mg immediate release tablet, Take 1 tablet (60 mg) by mouth 2 times a day., Disp: 180 tablet, Rfl: 1    estradiol (Estrace) 0.01 % (0.1 mg/gram) vaginal cream, One toshia vaginally three times week, Disp: 42.5 g, Rfl: 1    fish oil (Omega-3) 60- mg " capsule, Fish Oil OIL  Refills: 0     Active, Disp: , Rfl:     fluticasone (Flovent HFA) 110 mcg/actuation inhaler, Inhale 2 puffs 2 times a day. Rinse mouth with water after use to reduce aftertaste and incidence of candidiasis. Do not swallow., Disp: 12 g, Rfl: 3    glucosamine-chondroitin 500-400 mg tablet, Take 1 tablet by mouth 3 times a day. Take per directed, Disp: , Rfl:     multivitamin tablet, Take by mouth., Disp: , Rfl:     orphenadrine (Norflex) 100 mg 12 hr tablet, TAKE 1 TABLET BY MOUTH TWICE A DAY AS NEEDED FOR SPASMS, Disp: 30 tablet, Rfl: 0    polyethylene glycol (Miralax) 17 gram/dose powder, Take 17 g by mouth. Take per directed, Disp: , Rfl:     saccharomyces boulardii (Florastor) 250 mg capsule, Take 1 capsule (250 mg) by mouth 2 times a day., Disp: , Rfl:     sertraline (Zoloft) 100 mg tablet, TAKE 1 TABLET (100 MG) BY MOUTH ONCE DAILY., Disp: 30 tablet, Rfl: 5    tetrahydrozoline (Visine) 0.05 % ophthalmic solution, Administer into affected eye(s) 4 times a day., Disp: , Rfl:     meloxicam (Mobic) 15 mg tablet, Take 1 tablet (15 mg) by mouth once daily as needed (pain)., Disp: 30 tablet, Rfl: 2   Objective     Visit Vitals  /74 (BP Location: Left arm, Patient Position: Sitting)   Pulse 64      Physical Exam  Vitals reviewed.   HENT:      Head: Normocephalic.   Cardiovascular:      Rate and Rhythm: Normal rate and regular rhythm.   Pulmonary:      Effort: Pulmonary effort is normal.      Breath sounds: Normal breath sounds.   Neurological:      General: No focal deficit present.      Mental Status: She is alert.   Psychiatric:         Mood and Affect: Mood normal.         Behavior: Behavior normal.       Lab on 05/09/2024   Component Date Value Ref Range Status    WBC 05/09/2024 4.1 (L)  4.4 - 11.3 x10*3/uL Final    nRBC 05/09/2024 0.0  0.0 - 0.0 /100 WBCs Final    RBC 05/09/2024 4.91  4.00 - 5.20 x10*6/uL Final    Hemoglobin 05/09/2024 14.6  12.0 - 16.0 g/dL Final    Hematocrit  05/09/2024 43.5  36.0 - 46.0 % Final    MCV 05/09/2024 89  80 - 100 fL Final    MCH 05/09/2024 29.7  26.0 - 34.0 pg Final    MCHC 05/09/2024 33.6  32.0 - 36.0 g/dL Final    RDW 05/09/2024 13.2  11.5 - 14.5 % Final    Platelets 05/09/2024 162  150 - 450 x10*3/uL Final    Neutrophils % 05/09/2024 51.0  40.0 - 80.0 % Final    Immature Granulocytes %, Automated 05/09/2024 0.2  0.0 - 0.9 % Final    Immature Granulocyte Count (IG) includes promyelocytes, myelocytes and metamyelocytes but does not include bands. Percent differential counts (%) should be interpreted in the context of the absolute cell counts (cells/UL).    Lymphocytes % 05/09/2024 32.4  13.0 - 44.0 % Final    Monocytes % 05/09/2024 12.0  2.0 - 10.0 % Final    Eosinophils % 05/09/2024 2.2  0.0 - 6.0 % Final    Basophils % 05/09/2024 2.2  0.0 - 2.0 % Final    Neutrophils Absolute 05/09/2024 2.09  1.20 - 7.70 x10*3/uL Final    Percent differential counts (%) should be interpreted in the context of the absolute cell counts (cells/uL).    Immature Granulocytes Absolute, Au* 05/09/2024 0.01  0.00 - 0.70 x10*3/uL Final    Lymphocytes Absolute 05/09/2024 1.33  1.20 - 4.80 x10*3/uL Final    Monocytes Absolute 05/09/2024 0.49  0.10 - 1.00 x10*3/uL Final    Eosinophils Absolute 05/09/2024 0.09  0.00 - 0.70 x10*3/uL Final    Basophils Absolute 05/09/2024 0.09  0.00 - 0.10 x10*3/uL Final    Glucose 05/09/2024 85  74 - 99 mg/dL Final    Sodium 05/09/2024 140  136 - 145 mmol/L Final    Potassium 05/09/2024 3.8  3.5 - 5.3 mmol/L Final    Chloride 05/09/2024 105  98 - 107 mmol/L Final    Bicarbonate 05/09/2024 29  21 - 32 mmol/L Final    Anion Gap 05/09/2024 10  10 - 20 mmol/L Final    Urea Nitrogen 05/09/2024 21  6 - 23 mg/dL Final    Creatinine 05/09/2024 0.78  0.50 - 1.05 mg/dL Final    eGFR 05/09/2024 85  >60 mL/min/1.73m*2 Final    Calculations of estimated GFR are performed using the 2021 CKD-EPI Study Refit equation without the race variable for the IDMS-Traceable  creatinine methods.  https://jasn.asnjournals.org/content/early/2021/09/22/ASN.0306532741    Calcium 05/09/2024 9.0  8.6 - 10.3 mg/dL Final    Albumin 05/09/2024 4.5  3.4 - 5.0 g/dL Final    Alkaline Phosphatase 05/09/2024 75  33 - 136 U/L Final    Total Protein 05/09/2024 6.3 (L)  6.4 - 8.2 g/dL Final    AST 05/09/2024 18  9 - 39 U/L Final    Bilirubin, Total 05/09/2024 0.5  0.0 - 1.2 mg/dL Final    ALT 05/09/2024 15  7 - 45 U/L Final    Patients treated with Sulfasalazine may generate falsely decreased results for ALT.    Cholesterol 05/09/2024 204 (H)  0 - 199 mg/dL Final          Age      Desirable   Borderline High   High     0-19 Y     0 - 169       170 - 199     >/= 200    20-24 Y     0 - 189       190 - 224     >/= 225         >24 Y     0 - 199       200 - 239     >/= 240   **All ranges are based on fasting samples. Specific   therapeutic targets will vary based on patient-specific   cardiac risk.    Pediatric guidelines reference:Pediatrics 2011, 128(S5).Adult guidelines reference: NCEP ATPIII Guidelines,NICOLASA 2001, 258:2486-97    Venipuncture immediately after or during the administration of Metamizole may lead to falsely low results. Testing should be performed immediately prior to Metamizole dosing.    HDL-Cholesterol 05/09/2024 58.0  mg/dL Final      Age       Very Low   Low     Normal    High    0-19 Y    < 35      < 40     40-45     ----  20-24 Y    ----     < 40      >45      ----        >24 Y      ----     < 40     40-60      >60      Cholesterol/HDL Ratio 05/09/2024 3.5   Final      Ref Values  Desirable  < 3.4  High Risk  > 5.0    LDL Calculated 05/09/2024 126 (H)  <=99 mg/dL Final                                Near   Borderline      AGE      Desirable  Optimal    High     High     Very High     0-19 Y     0 - 109     ---    110-129   >/= 130     ----    20-24 Y     0 - 119     ---    120-159   >/= 160     ----      >24 Y     0 -  99   100-129  130-159   160-189     >/=190      VLDL 05/09/2024 20   0 - 40 mg/dL Final    Triglycerides 05/09/2024 98  0 - 149 mg/dL Final       Age         Desirable   Borderline High   High     Very High   0 D-90 D    19 - 174         ----         ----        ----  91 D- 9 Y     0 -  74        75 -  99     >/= 100      ----    10-19 Y     0 -  89        90 - 129     >/= 130      ----    20-24 Y     0 - 114       115 - 149     >/= 150      ----         >24 Y     0 - 149       150 - 199    200- 499    >/= 500    Venipuncture immediately after or during the administration of Metamizole may lead to falsely low results. Testing should be performed immediately prior to Metamizole dosing.    Non HDL Cholesterol 05/09/2024 146  0 - 149 mg/dL Final          Age       Desirable   Borderline High   High     Very High     0-19 Y     0 - 119       120 - 144     >/= 145    >/= 160    20-24 Y     0 - 149       150 - 189     >/= 190      ----         >24 Y    30 mg/dL above LDL Cholesterol goal      Thyroid Stimulating Hormone 05/09/2024 1.59  0.44 - 3.98 mIU/L Final       Assessment/Plan   Problem List Items Addressed This Visit       Anxiety    Cervicalgia    Hypertension    Relevant Orders    CBC and Auto Differential    Basic Metabolic Panel     Other Visit Diagnoses       Bilateral thumb pain    -  Primary    Relevant Medications    meloxicam (Mobic) 15 mg tablet             As per hpi.  Call concerns.  Followup six months, labs prior.       Bee Valenzuela MD

## 2024-05-14 NOTE — PROGRESS NOTES
Med check; labs; has seen ortho for arthritis in thumb joint; was told to ask about Meloxicam to take

## 2024-05-16 ENCOUNTER — PHARMACY VISIT (OUTPATIENT)
Dept: PHARMACY | Facility: CLINIC | Age: 64
End: 2024-05-16
Payer: COMMERCIAL

## 2024-07-09 DIAGNOSIS — F41.9 ANXIETY: ICD-10-CM

## 2024-07-09 DIAGNOSIS — Z00.00 ENCOUNTER FOR GENERAL ADULT MEDICAL EXAMINATION WITHOUT ABNORMAL FINDINGS: ICD-10-CM

## 2024-07-10 RX ORDER — SERTRALINE HYDROCHLORIDE 100 MG/1
100 TABLET, FILM COATED ORAL DAILY
Qty: 90 TABLET | Refills: 1 | Status: SHIPPED | OUTPATIENT
Start: 2024-07-10

## 2024-07-10 RX ORDER — DILTIAZEM HYDROCHLORIDE 60 MG/1
60 TABLET, FILM COATED ORAL 2 TIMES DAILY
Qty: 180 TABLET | Refills: 1 | Status: SHIPPED | OUTPATIENT
Start: 2024-07-10

## 2024-07-11 ENCOUNTER — PHARMACY VISIT (OUTPATIENT)
Dept: PHARMACY | Facility: CLINIC | Age: 64
End: 2024-07-11
Payer: COMMERCIAL

## 2024-07-11 ENCOUNTER — OFFICE VISIT (OUTPATIENT)
Dept: URGENT CARE | Facility: CLINIC | Age: 64
End: 2024-07-11
Payer: COMMERCIAL

## 2024-07-11 VITALS
RESPIRATION RATE: 12 BRPM | OXYGEN SATURATION: 96 % | DIASTOLIC BLOOD PRESSURE: 89 MMHG | SYSTOLIC BLOOD PRESSURE: 136 MMHG | HEIGHT: 66 IN | TEMPERATURE: 98.3 F | BODY MASS INDEX: 21.69 KG/M2 | WEIGHT: 135 LBS | HEART RATE: 65 BPM

## 2024-07-11 DIAGNOSIS — L23.7 POISON IVY DERMATITIS: Primary | ICD-10-CM

## 2024-07-11 PROCEDURE — RXMED WILLOW AMBULATORY MEDICATION CHARGE

## 2024-07-11 PROCEDURE — 99213 OFFICE O/P EST LOW 20 MIN: CPT | Performed by: NURSE PRACTITIONER

## 2024-07-11 RX ORDER — PREDNISONE 10 MG/1
TABLET ORAL
Qty: 21 TABLET | Refills: 0 | Status: SHIPPED | OUTPATIENT
Start: 2024-07-11

## 2024-07-11 RX ORDER — TRIAMCINOLONE ACETONIDE 1 MG/G
OINTMENT TOPICAL
Qty: 15 G | Refills: 0 | Status: SHIPPED | OUTPATIENT
Start: 2024-07-11

## 2024-07-11 NOTE — PROGRESS NOTES
[facility]  Olga Springer, APRN-CNP     Visit Note - 7/11/2024 2:33 PM   This note was generated with voice recognition software and may contain errors including spelling, grammar, syntax, and misrecognization of what was dictated.    Patient: Mayra Mcclure, MRN: [id number], 63 y.o., female   PCP: Bee Valenzuela MD  ------------------------------------  ALLERGIES: No Known Allergies     CURRENT MEDICATIONS:   Current Outpatient Medications   Medication Instructions    albuterol (Ventolin HFA) 90 mcg/actuation inhaler 2 puffs, inhalation, Every 4 hours PRN    artificial tears, dextran-hypomel-glycerin, 0.1-0.3-0.2 % ophthalmic solution ophthalmic (eye), 4 times daily    calcium carbonate-vitamin D3 600 mg-20 mcg (800 unit) tablet 1 tablet, oral, Daily    coffee xt-phosphatidyl serine (Neuriva Original) 100-100 mg capsule oral    collagen-biotin-ascorbic acid (Collagen 1500 Plus C) 500 mg-800 mcg- 50 mg capsule oral    cranberry extract 200 mg capsule oral    diclofenac sodium 1 % kit 1 Application, Topical, 2 times daily    dilTIAZem (CARDIZEM) 60 mg, oral, 2 times daily    estradiol (Estrace) 0.01 % (0.1 mg/gram) vaginal cream One toshia vaginally three times week    fish oil (Omega-3) 60- mg capsule Fish Oil OIL   Refills: 0       Active    fluticasone (Flovent HFA) 110 mcg/actuation inhaler 2 puffs, inhalation, 2 times daily, Rinse mouth with water after use to reduce aftertaste and incidence of candidiasis. Do not swallow.    glucosamine-chondroitin 500-400 mg tablet 1 tablet, oral, 3 times daily, Take per directed    meloxicam (MOBIC) 15 mg, oral, Daily PRN    multivitamin tablet oral    orphenadrine (Norflex) 100 mg 12 hr tablet TAKE 1 TABLET BY MOUTH TWICE A DAY AS NEEDED FOR SPASMS    polyethylene glycol (MIRALAX) 17 g, oral, Take per directed    predniSONE (Deltasone) 10 mg tablet Take 6 tabs PO daily x1 day, then take 5 tabs daily x1 day, then take 4 tabs daily x1 day, then take  "3 tabs daily x1 day, then take 2 tabs daily x1 day, then take 1 tab daily x1 day. Take with a meal.    saccharomyces boulardii (FLORASTOR) 250 mg, oral, 2 times daily    sertraline (ZOLOFT) 100 mg, oral, Daily    tetrahydrozoline (Visine) 0.05 % ophthalmic solution ophthalmic (eye), 4 times daily    triamcinolone (Kenalog) 0.1 % ointment Apply topically to affected area 2-3 times a day as needed for itching/rash. Max of 7 days     ------------------------------------  PAST MEDICAL HX:  Patient Active Problem List   Diagnosis    Anxiety    Bilateral hearing loss    Cervicalgia    History of hysterectomy    Hypertension    Prolapse of female pelvic organs    Recurrent UTI    Mitral regurgitation    Meningioma (Multi)    Aortic ectasia (CMS-HCC)    Bilateral hand pain      SURGICAL HX:  Past Surgical History:   Procedure Laterality Date    BREAST BIOPSY Right 11/21/2022    OTHER SURGICAL HISTORY  11/13/2019    Colonoscopy    OTHER SURGICAL HISTORY  11/13/2019    Tonsillectomy    OTHER SURGICAL HISTORY  11/13/2019    Hysterectomy    OTHER SURGICAL HISTORY  11/13/2019    Cystocele repair    OTHER SURGICAL HISTORY  11/13/2019    Hernia repair      FAMILY HX:   No pertinent history.   SOCIAL HX:    reports that she has never smoked. She has never used smokeless tobacco.  ------------------------------------  CHIEF COMPLAINT:   Chief Complaint   Patient presents with    Rash     On L hand, wrist x 2 days,itching and keep spreading      HISTORY OF PRESENT ILLNESS: The history was obtained from patientOni Nunez is a 63 y.o. female, who presents with a chief complaint of rash that started on Tuesday. Reports she has been camping in a wooded area, and she started to develop an itchy rash on her L hand; since onset, rash has spread higher up on her wrist and forearm. It has drained a small amount of clear fluid.  She reports rash is intensely itchy and \"burns\" a little when she scratches it. Denies any tenderness. Denies any " "fever/chills, n/v, or other systemic symptoms. Reports she has been applying OTC anti-itch cream without much relief. No other complaints. Reports has history of bad reactions to poison ivy and requests rx for prednisone.     REVIEW OF SYSTEMS:  10 systems reviewed negative with exception of history of present illness as listed above.    TODAY'S VITALS: /89 (BP Location: Left arm, Patient Position: Sitting)   Pulse 65   Temp 36.8 °C (98.3 °F) (Temporal)   Resp 12   Ht 1.676 m (5' 6\")   Wt 61.2 kg (135 lb)   LMP  (LMP Unknown)   SpO2 96%   BMI 21.79 kg/m²     PHYSICAL EXAMINATION:  General:  Pleasant female, alert and oriented, in no acute distress.   Eyes:  Pupils equal, round and reactive to light. Eyes non-icteric; conjunctiva clear.   HENT: Airway patent. Normocephalic. Nares patent.   Neck:  Supple; no lymphadenopathy  Respiratory:  Lungs are clear to auscultation, Respirations are non-labored, Breath sounds are equal, Symmetrical chest wall expansion.    Cardiovascular:  Normal rate, Regular rhythm. Normal S1S2. No m/r/g.  Musculoskeletal: Grossly normal.     Integumentary:  Pink papular/vesicular rash scattered to patches on L hand/wrist/forearm. No crusting or active drainage; no tenderness, localized induration, fluctuance, or pointing; no streaking.   Neurologic:  Alert, Oriented, Normal sensory, Normal motor function. No focal abnormalities.   Cognition and Speech:  Oriented, Speech clear and coherent.    Psychiatric:  Cooperative, Appropriate mood & affect.       ------------------------------------  Medical Decision Making  LABORATORY or RADIOLOGICAL IMAGING ORDERS/RESULTS:   None    IMPRESSION/PLAN:  Course: Worsening; stable    1. Poison ivy dermatitis  - predniSONE (Deltasone) 10 mg tablet; Take 6 tabs PO daily x1 day, then take 5 tabs daily x1 day, then take 4 tabs daily x1 day, then take 3 tabs daily x1 day, then take 2 tabs daily x1 day, then take 1 tab daily x1 day. Take with a meal. " " Dispense: 21 tablet; Refill: 0  - triamcinolone (Kenalog) 0.1 % ointment; Apply topically to affected area 2-3 times a day as needed for itching/rash. Max of 7 days  Dispense: 15 g; Refill: 0    Sxs consistent with contact dermatitis r/t plant. Will start topical triamcinolone today; per pt's request, rx for \"watch and wait\" oral prednisone also provided with instructions to begin if not having relief with topical treatment over the next few days. Encouraged to consider course of Zyrtec to help with management of pruritis. Cool compresses may also be helpful. Reviewed instructions and common side effects of medications. Discussed poison ivy-type rashes and expectations for resolution at length, as well as prevention strategies for the future - urged to wash anything that may have come in contact with oils from potential irritants with soap and water to prevent spread/re-infection. Encouraged to keep skin clean and dry, avoid scratching as able, and monitor closely for any signs/symptoms of infection (fever, purulent drainage, red streaking, increased swelling/redness/induration, etc) - none noted at this time. Advised should f/u with PCP if not improving over the next 3-5 days, or to seek care sooner if any additional problems/red flags develop. Patient agreed with plan of care; questions were encouraged and answered.       SHAHEEN Abernathy-CNP   Advanced Practice Provider  [facility]  "

## 2024-09-09 PROCEDURE — RXMED WILLOW AMBULATORY MEDICATION CHARGE

## 2024-09-13 ENCOUNTER — PHARMACY VISIT (OUTPATIENT)
Dept: PHARMACY | Facility: CLINIC | Age: 64
End: 2024-09-13
Payer: COMMERCIAL

## 2024-10-15 ENCOUNTER — PHARMACY VISIT (OUTPATIENT)
Dept: PHARMACY | Facility: CLINIC | Age: 64
End: 2024-10-15
Payer: COMMERCIAL

## 2024-10-15 PROCEDURE — RXMED WILLOW AMBULATORY MEDICATION CHARGE

## 2024-11-08 ENCOUNTER — LAB (OUTPATIENT)
Dept: LAB | Facility: LAB | Age: 64
End: 2024-11-08
Payer: COMMERCIAL

## 2024-11-08 DIAGNOSIS — I10 PRIMARY HYPERTENSION: ICD-10-CM

## 2024-11-08 LAB
ANION GAP SERPL CALC-SCNC: 9 MMOL/L (ref 10–20)
BASOPHILS # BLD AUTO: 0.12 X10*3/UL (ref 0–0.1)
BASOPHILS NFR BLD AUTO: 2.4 %
BUN SERPL-MCNC: 22 MG/DL (ref 6–23)
CALCIUM SERPL-MCNC: 9.3 MG/DL (ref 8.6–10.3)
CHLORIDE SERPL-SCNC: 105 MMOL/L (ref 98–107)
CO2 SERPL-SCNC: 32 MMOL/L (ref 21–32)
CREAT SERPL-MCNC: 0.83 MG/DL (ref 0.5–1.05)
EGFRCR SERPLBLD CKD-EPI 2021: 79 ML/MIN/1.73M*2
EOSINOPHIL # BLD AUTO: 0.09 X10*3/UL (ref 0–0.7)
EOSINOPHIL NFR BLD AUTO: 1.8 %
ERYTHROCYTE [DISTWIDTH] IN BLOOD BY AUTOMATED COUNT: 12.6 % (ref 11.5–14.5)
GLUCOSE SERPL-MCNC: 87 MG/DL (ref 74–99)
HCT VFR BLD AUTO: 44.4 % (ref 36–46)
HGB BLD-MCNC: 14.6 G/DL (ref 12–16)
IMM GRANULOCYTES # BLD AUTO: 0.01 X10*3/UL (ref 0–0.7)
IMM GRANULOCYTES NFR BLD AUTO: 0.2 % (ref 0–0.9)
LYMPHOCYTES # BLD AUTO: 1.33 X10*3/UL (ref 1.2–4.8)
LYMPHOCYTES NFR BLD AUTO: 26.3 %
MCH RBC QN AUTO: 29.4 PG (ref 26–34)
MCHC RBC AUTO-ENTMCNC: 32.9 G/DL (ref 32–36)
MCV RBC AUTO: 90 FL (ref 80–100)
MONOCYTES # BLD AUTO: 0.41 X10*3/UL (ref 0.1–1)
MONOCYTES NFR BLD AUTO: 8.1 %
NEUTROPHILS # BLD AUTO: 3.1 X10*3/UL (ref 1.2–7.7)
NEUTROPHILS NFR BLD AUTO: 61.2 %
NRBC BLD-RTO: 0 /100 WBCS (ref 0–0)
PLATELET # BLD AUTO: 201 X10*3/UL (ref 150–450)
POTASSIUM SERPL-SCNC: 4.1 MMOL/L (ref 3.5–5.3)
RBC # BLD AUTO: 4.96 X10*6/UL (ref 4–5.2)
SODIUM SERPL-SCNC: 142 MMOL/L (ref 136–145)
WBC # BLD AUTO: 5.1 X10*3/UL (ref 4.4–11.3)

## 2024-11-08 PROCEDURE — 80048 BASIC METABOLIC PNL TOTAL CA: CPT

## 2024-11-08 PROCEDURE — 36415 COLL VENOUS BLD VENIPUNCTURE: CPT

## 2024-11-08 PROCEDURE — 85025 COMPLETE CBC W/AUTO DIFF WBC: CPT

## 2024-11-11 ENCOUNTER — APPOINTMENT (OUTPATIENT)
Dept: PRIMARY CARE | Facility: CLINIC | Age: 64
End: 2024-11-11
Payer: COMMERCIAL

## 2024-11-11 VITALS
HEART RATE: 76 BPM | BODY MASS INDEX: 22.34 KG/M2 | WEIGHT: 139 LBS | HEIGHT: 66 IN | DIASTOLIC BLOOD PRESSURE: 68 MMHG | SYSTOLIC BLOOD PRESSURE: 112 MMHG

## 2024-11-11 DIAGNOSIS — Z23 NEED FOR INFLUENZA VACCINATION: Primary | ICD-10-CM

## 2024-11-11 DIAGNOSIS — J30.89 ENVIRONMENTAL AND SEASONAL ALLERGIES: ICD-10-CM

## 2024-11-11 DIAGNOSIS — M79.642 BILATERAL HAND PAIN: ICD-10-CM

## 2024-11-11 DIAGNOSIS — I10 PRIMARY HYPERTENSION: ICD-10-CM

## 2024-11-11 DIAGNOSIS — M79.641 BILATERAL HAND PAIN: ICD-10-CM

## 2024-11-11 DIAGNOSIS — F41.9 ANXIETY: ICD-10-CM

## 2024-11-11 DIAGNOSIS — D32.9 MENINGIOMA (MULTI): ICD-10-CM

## 2024-11-11 PROCEDURE — 90471 IMMUNIZATION ADMIN: CPT | Performed by: FAMILY MEDICINE

## 2024-11-11 PROCEDURE — 3074F SYST BP LT 130 MM HG: CPT | Performed by: FAMILY MEDICINE

## 2024-11-11 PROCEDURE — 3078F DIAST BP <80 MM HG: CPT | Performed by: FAMILY MEDICINE

## 2024-11-11 PROCEDURE — 3008F BODY MASS INDEX DOCD: CPT | Performed by: FAMILY MEDICINE

## 2024-11-11 PROCEDURE — 1036F TOBACCO NON-USER: CPT | Performed by: FAMILY MEDICINE

## 2024-11-11 PROCEDURE — 99214 OFFICE O/P EST MOD 30 MIN: CPT | Performed by: FAMILY MEDICINE

## 2024-11-11 PROCEDURE — 90673 RIV3 VACCINE NO PRESERV IM: CPT | Performed by: FAMILY MEDICINE

## 2024-11-11 PROCEDURE — RXMED WILLOW AMBULATORY MEDICATION CHARGE

## 2024-11-11 RX ORDER — SERTRALINE HYDROCHLORIDE 100 MG/1
100 TABLET, FILM COATED ORAL DAILY
Qty: 90 TABLET | Refills: 1 | Status: SHIPPED | OUTPATIENT
Start: 2024-11-11

## 2024-11-11 RX ORDER — ORPHENADRINE CITRATE 100 MG/1
TABLET, EXTENDED RELEASE ORAL
Qty: 30 TABLET | Refills: 0 | Status: SHIPPED | OUTPATIENT
Start: 2024-11-11

## 2024-11-11 NOTE — PROGRESS NOTES
"Patient presents for periodic surveillance of chronic medical problems.     Subjective  Mayra Mcclure \"Araceli\" is a 63 y.o. female who presents for Follow-up.  HPI  Was on flovent for allergy related symptoms in [place], was prescribed albuterol here but never filled it.  States she had a lot of drainage, was clearing her throat, frequent laryngitis.    History of meningioma, was seeing Dr Woods, was lost to follow up.  He's retired and requesting referral for same. Recommend updating MRI.  Had plantar fasciitis in left foot, given stretches and meloxicam through podiatry.   Concerned about five pound weight gain.  Taking tylenol pm for sleep.    Review of Systems   All other systems reviewed and are negative.  .    No Known Allergies    Current Outpatient Medications on File Prior to Visit   Medication Sig Dispense Refill    artificial tears, dextran-hypomel-glycerin, 0.1-0.3-0.2 % ophthalmic solution Administer into affected eye(s) 4 times a day.      calcium carbonate-vitamin D3 600 mg-20 mcg (800 unit) tablet Take 1 tablet by mouth once daily.      coffee xt-phosphatidyl serine (Neuriva Original) 100-100 mg capsule Take by mouth.      collagen-biotin-ascorbic acid (Collagen 1500 Plus C) 500 mg-800 mcg- 50 mg capsule Take by mouth.      cranberry extract 200 mg capsule Take by mouth.      diclofenac sodium 1 % kit Apply 1 Application topically 2 times a day. 100 each 1    dilTIAZem (Cardizem) 60 mg immediate release tablet TAKE 1 TABLET (60 MG) BY MOUTH 2 TIMES A DAY. 180 tablet 1    estradiol (Estrace) 0.01 % (0.1 mg/gram) vaginal cream One toshia vaginally three times week 42.5 g 1    fish oil (Omega-3) 60- mg capsule Fish Oil OIL   Refills: 0       Active      glucosamine-chondroitin 500-400 mg tablet Take 1 tablet by mouth 3 times a day. Take per directed      meloxicam (Mobic) 15 mg tablet Take 1 tablet (15 mg) by mouth once daily as needed (pain). 30 tablet 2    multivitamin tablet Take by mouth.   "    polyethylene glycol (Miralax) 17 gram/dose powder Mix 17 g of powder and drink. Take per directed      saccharomyces boulardii (Florastor) 250 mg capsule Take 1 capsule (250 mg) by mouth 2 times a day.      tetrahydrozoline (Visine) 0.05 % ophthalmic solution Administer into affected eye(s) 4 times a day.      triamcinolone (Kenalog) 0.1 % ointment Apply topically to affected area 2-3 times a day as needed for itching/rash. Max of 7 days 15 g 0    [DISCONTINUED] orphenadrine (Norflex) 100 mg 12 hr tablet TAKE 1 TABLET BY MOUTH TWICE A DAY AS NEEDED FOR SPASMS 30 tablet 0    [DISCONTINUED] sertraline (Zoloft) 100 mg tablet TAKE 1 TABLET BY MOUTH EVERY DAY 90 tablet 1    [DISCONTINUED] albuterol (Ventolin HFA) 90 mcg/actuation inhaler Inhale 2 puffs every 4 hours if needed for wheezing or shortness of breath. (Patient not taking: Reported on 11/11/2024) 8 g 5    [DISCONTINUED] fluticasone (Flovent HFA) 110 mcg/actuation inhaler Inhale 2 puffs 2 times a day. Rinse mouth with water after use to reduce aftertaste and incidence of candidiasis. Do not swallow. (Patient not taking: Reported on 11/11/2024) 12 g 3    [DISCONTINUED] meloxicam (Mobic) 15 mg tablet Take 1 (one) tablet (15 mg total) by mouth daily . 30 tablet 3    [DISCONTINUED] predniSONE (Deltasone) 10 mg tablet Take 6 tabs daily x1 day, then take 5 tabs daily x1 day, then take 4 tabs daily x1 day, then take 3 tabs daily x1 day, then take 2 tabs daily x1 day, then take 1 tab daily x1 day. Take with a meal. 21 tablet 0     No current facility-administered medications on file prior to visit.       Patient Active Problem List   Diagnosis    Anxiety    Bilateral hearing loss    Cervicalgia    History of hysterectomy    Hypertension    Prolapse of female pelvic organs    Recurrent UTI    Mitral regurgitation    Meningioma (Multi)    Aortic ectasia    Bilateral hand pain    Environmental and seasonal allergies       Objective     Visit Vitals  /68 (BP  Location: Left arm, Patient Position: Sitting)   Pulse 76      Physical Exam  Vitals reviewed.   HENT:      Head: Normocephalic.   Cardiovascular:      Rate and Rhythm: Normal rate and regular rhythm.   Pulmonary:      Effort: Pulmonary effort is normal.      Breath sounds: Normal breath sounds.   Skin:     General: Skin is warm.   Neurological:      General: No focal deficit present.      Mental Status: She is alert.   Psychiatric:         Mood and Affect: Mood normal.       Lab on 11/08/2024   Component Date Value Ref Range Status    WBC 11/08/2024 5.1  4.4 - 11.3 x10*3/uL Final    nRBC 11/08/2024 0.0  0.0 - 0.0 /100 WBCs Final    RBC 11/08/2024 4.96  4.00 - 5.20 x10*6/uL Final    Hemoglobin 11/08/2024 14.6  12.0 - 16.0 g/dL Final    Hematocrit 11/08/2024 44.4  36.0 - 46.0 % Final    MCV 11/08/2024 90  80 - 100 fL Final    MCH 11/08/2024 29.4  26.0 - 34.0 pg Final    MCHC 11/08/2024 32.9  32.0 - 36.0 g/dL Final    RDW 11/08/2024 12.6  11.5 - 14.5 % Final    Platelets 11/08/2024 201  150 - 450 x10*3/uL Final    Neutrophils % 11/08/2024 61.2  40.0 - 80.0 % Final    Immature Granulocytes %, Automated 11/08/2024 0.2  0.0 - 0.9 % Final    Immature Granulocyte Count (IG) includes promyelocytes, myelocytes and metamyelocytes but does not include bands. Percent differential counts (%) should be interpreted in the context of the absolute cell counts (cells/UL).    Lymphocytes % 11/08/2024 26.3  13.0 - 44.0 % Final    Monocytes % 11/08/2024 8.1  2.0 - 10.0 % Final    Eosinophils % 11/08/2024 1.8  0.0 - 6.0 % Final    Basophils % 11/08/2024 2.4  0.0 - 2.0 % Final    Neutrophils Absolute 11/08/2024 3.10  1.20 - 7.70 x10*3/uL Final    Percent differential counts (%) should be interpreted in the context of the absolute cell counts (cells/uL).    Immature Granulocytes Absolute, Au* 11/08/2024 0.01  0.00 - 0.70 x10*3/uL Final    Lymphocytes Absolute 11/08/2024 1.33  1.20 - 4.80 x10*3/uL Final    Monocytes Absolute 11/08/2024 0.41   0.10 - 1.00 x10*3/uL Final    Eosinophils Absolute 11/08/2024 0.09  0.00 - 0.70 x10*3/uL Final    Basophils Absolute 11/08/2024 0.12 (H)  0.00 - 0.10 x10*3/uL Final    Glucose 11/08/2024 87  74 - 99 mg/dL Final    Sodium 11/08/2024 142  136 - 145 mmol/L Final    Potassium 11/08/2024 4.1  3.5 - 5.3 mmol/L Final    Chloride 11/08/2024 105  98 - 107 mmol/L Final    Bicarbonate 11/08/2024 32  21 - 32 mmol/L Final    Anion Gap 11/08/2024 9 (L)  10 - 20 mmol/L Final    Urea Nitrogen 11/08/2024 22  6 - 23 mg/dL Final    Creatinine 11/08/2024 0.83  0.50 - 1.05 mg/dL Final    eGFR 11/08/2024 79  >60 mL/min/1.73m*2 Final    Calculations of estimated GFR are performed using the 2021 CKD-EPI Study Refit equation without the race variable for the IDMS-Traceable creatinine methods.  https://jasn.asnjournals.org/content/early/2021/09/22/ASN.6622409186    Calcium 11/08/2024 9.3  8.6 - 10.3 mg/dL Final         Assessment/Plan   Problem List Items Addressed This Visit       Hypertension    Relevant Orders    CBC and Auto Differential    Comprehensive Metabolic Panel    Lipid Panel    TSH with reflex to Free T4 if abnormal    Vitamin B12    Meningioma (Multi)    Relevant Orders    MR brain w and wo IV contrast    Environmental and seasonal allergies     Other Visit Diagnoses       Need for influenza vaccination    -  Primary    Relevant Orders    Flu vaccine, trivalent, preservative free, no egg protein, age 18y+ (Flublok)             Follow up six months, labs prior, call concerns.      Bee Valenzuela MD

## 2024-11-14 ENCOUNTER — PHARMACY VISIT (OUTPATIENT)
Dept: PHARMACY | Facility: CLINIC | Age: 64
End: 2024-11-14
Payer: COMMERCIAL

## 2024-11-14 PROCEDURE — RXMED WILLOW AMBULATORY MEDICATION CHARGE

## 2024-11-21 ENCOUNTER — HOSPITAL ENCOUNTER (OUTPATIENT)
Dept: RADIOLOGY | Facility: HOSPITAL | Age: 64
Discharge: HOME | End: 2024-11-21
Payer: COMMERCIAL

## 2024-11-21 DIAGNOSIS — D32.9 MENINGIOMA (MULTI): ICD-10-CM

## 2024-11-21 PROCEDURE — 2550000001 HC RX 255 CONTRASTS: Performed by: FAMILY MEDICINE

## 2024-11-21 PROCEDURE — A9575 INJ GADOTERATE MEGLUMI 0.1ML: HCPCS | Performed by: FAMILY MEDICINE

## 2024-11-21 PROCEDURE — 70553 MRI BRAIN STEM W/O & W/DYE: CPT

## 2024-11-21 PROCEDURE — 70553 MRI BRAIN STEM W/O & W/DYE: CPT | Performed by: RADIOLOGY

## 2024-11-21 RX ORDER — GADOTERATE MEGLUMINE 376.9 MG/ML
13 INJECTION INTRAVENOUS
Status: COMPLETED | OUTPATIENT
Start: 2024-11-21 | End: 2024-11-21

## 2025-01-20 ENCOUNTER — APPOINTMENT (OUTPATIENT)
Dept: NEUROSURGERY | Facility: HOSPITAL | Age: 65
End: 2025-01-20
Payer: COMMERCIAL

## 2025-02-03 ENCOUNTER — TELEMEDICINE (OUTPATIENT)
Dept: NEUROSURGERY | Facility: HOSPITAL | Age: 65
End: 2025-02-03

## 2025-02-03 DIAGNOSIS — D32.9 MENINGIOMA (MULTI): ICD-10-CM

## 2025-02-03 PROCEDURE — 99213 OFFICE O/P EST LOW 20 MIN: CPT | Mod: 95 | Performed by: NURSE PRACTITIONER

## 2025-02-03 PROCEDURE — 99213 OFFICE O/P EST LOW 20 MIN: CPT | Performed by: NURSE PRACTITIONER

## 2025-02-03 NOTE — PROGRESS NOTES
[facility]  Neurosurgery    History of Present Illness      Mayra Mcclure is a 64-year-old female with a PMH significant for HTN, OA, anxiety, who was following with Dr. Woods after an episode of blurred vision and dizziness she was found to have a dural based lesion in the LF lobe consistent with meningioma. She was recommended for continued observation given small size and lack of edema. Last MRI was 11/2024 at which time continued to show a stable 14 x 14 x 11mm dural based LF lesion. Patient presents for virtual visit for follow up.       Episode of cloudy vision with diplopia while driving this only lasted for a minute or two and went away.  This has never occurred again. Denies any headaches, dizziness.     Familial history: Stroke - mother, grandmother             Objective      Vitals/ Physical Exam:     Deferred - virtual visit             Relevant Results:    MRI Brain w/wo 11/21/24:         MRI Brain w/wo 04/20/23:               Assessment & Plan      Diagnosis:  Diagnoses and all orders for this visit:  Meningioma (Multi)  -     Referral to Neurosurgery          Provider Impression:         Medical History     Past Medical History:   Diagnosis Date    Anxiety disorder, unspecified 01/06/2023    Anxiety    Cervicalgia 01/06/2023    Cervicalgia    Encounter for full-term uncomplicated delivery     Normal vaginal delivery    Essential (primary) hypertension 01/06/2023    Hypertension    Personal history of other mental and behavioral disorders 05/02/2022    History of depression    Unspecified hearing loss, bilateral 05/02/2022    Bilateral hearing loss     Past Surgical History:   Procedure Laterality Date    BREAST BIOPSY Right 11/21/2022    OTHER SURGICAL HISTORY  11/13/2019    Colonoscopy    OTHER SURGICAL HISTORY  11/13/2019    Tonsillectomy    OTHER SURGICAL HISTORY  11/13/2019    Hysterectomy    OTHER SURGICAL HISTORY  11/13/2019    Cystocele repair    OTHER SURGICAL HISTORY  11/13/2019     Hernia repair     Social History     Tobacco Use    Smoking status: Never    Smokeless tobacco: Never   Substance Use Topics    Alcohol use: Yes    Drug use: Never     Family History   Problem Relation Name Age of Onset    Hypertension Mother      Cancer Mother      Uterine cancer Mother      Hearing loss Father      Other (Parkinson disease) Father          Symptomatic    Thyroid disease Sister      Stroke Other Grandparent      No Known Allergies  Current Outpatient Medications   Medication Instructions    artificial tears, dextran-hypomel-glycerin, 0.1-0.3-0.2 % ophthalmic solution 4 times daily    calcium carbonate-vitamin D3 600 mg-20 mcg (800 unit) tablet 1 tablet, Daily    coffee xt-phosphatidyl serine (Neuriva Original) 100-100 mg capsule Take by mouth.    collagen-biotin-ascorbic acid (Collagen 1500 Plus C) 500 mg-800 mcg- 50 mg capsule Take by mouth.    cranberry extract 200 mg capsule Take by mouth.    diclofenac sodium 1 % kit 1 Application, Topical, 2 times daily    dilTIAZem (CARDIZEM) 60 mg, oral, 2 times daily    estradiol (Estrace) 0.01 % (0.1 mg/gram) vaginal cream One toshia vaginally three times week    fish oil (Omega-3) 60- mg capsule Fish Oil OIL   Refills: 0       Active    glucosamine-chondroitin 500-400 mg tablet 1 tablet, 3 times daily    meloxicam (MOBIC) 15 mg, oral, Daily PRN    multivitamin tablet Take by mouth.    orphenadrine (Norflex) 100 mg 12 hr tablet TAKE 1 TABLET BY MOUTH TWICE A DAY AS NEEDED FOR SPASMS    polyethylene glycol (MIRALAX) 17 g    saccharomyces boulardii (FLORASTOR) 250 mg, 2 times daily    sertraline (ZOLOFT) 100 mg, oral, Daily    tetrahydrozoline (Visine) 0.05 % ophthalmic solution 4 times daily    triamcinolone (Kenalog) 0.1 % ointment Apply topically to affected area 2-3 times a day as needed for itching/rash. Max of 7 days                           One toshia vaginally three times week    fish oil (Omega-3) 60- mg capsule Fish Oil OIL   Refills: 0       Active    glucosamine-chondroitin 500-400 mg tablet 1 tablet, 3 times daily    meloxicam (MOBIC) 15 mg, oral, Daily PRN    multivitamin tablet Take by mouth.    orphenadrine (Norflex) 100 mg 12 hr tablet TAKE 1 TABLET BY MOUTH TWICE A DAY AS NEEDED FOR SPASMS    polyethylene glycol (MIRALAX) 17 g    saccharomyces boulardii (FLORASTOR) 250 mg, 2 times daily    sertraline (ZOLOFT) 100 mg, oral, Daily    tetrahydrozoline (Visine) 0.05 % ophthalmic solution 4 times daily    triamcinolone (Kenalog) 0.1 % ointment Apply topically to affected area 2-3 times a day as needed for itching/rash. Max of 7 days

## 2025-02-07 DIAGNOSIS — Z00.00 ENCOUNTER FOR GENERAL ADULT MEDICAL EXAMINATION WITHOUT ABNORMAL FINDINGS: ICD-10-CM

## 2025-02-07 PROCEDURE — RXMED WILLOW AMBULATORY MEDICATION CHARGE

## 2025-02-07 RX ORDER — DILTIAZEM HYDROCHLORIDE 60 MG/1
60 TABLET, FILM COATED ORAL 2 TIMES DAILY
Qty: 180 TABLET | Refills: 1 | Status: SHIPPED | OUTPATIENT
Start: 2025-02-07

## 2025-02-13 ENCOUNTER — PHARMACY VISIT (OUTPATIENT)
Dept: PHARMACY | Facility: CLINIC | Age: 65
End: 2025-02-13
Payer: MEDICARE

## 2025-03-06 ENCOUNTER — APPOINTMENT (OUTPATIENT)
Dept: ORTHOPEDIC SURGERY | Facility: CLINIC | Age: 65
End: 2025-03-06
Payer: COMMERCIAL

## 2025-03-18 ENCOUNTER — APPOINTMENT (OUTPATIENT)
Dept: ORTHOPEDIC SURGERY | Facility: CLINIC | Age: 65
End: 2025-03-18
Payer: COMMERCIAL

## 2025-03-18 ENCOUNTER — HOSPITAL ENCOUNTER (OUTPATIENT)
Dept: RADIOLOGY | Facility: EXTERNAL LOCATION | Age: 65
Discharge: HOME | End: 2025-03-18

## 2025-03-18 ENCOUNTER — HOSPITAL ENCOUNTER (OUTPATIENT)
Dept: RADIOLOGY | Facility: CLINIC | Age: 65
Discharge: HOME | End: 2025-03-18
Payer: COMMERCIAL

## 2025-03-18 VITALS — BODY MASS INDEX: 22.27 KG/M2 | WEIGHT: 138 LBS

## 2025-03-18 DIAGNOSIS — M18.0 PRIMARY OSTEOARTHRITIS OF BOTH FIRST CARPOMETACARPAL JOINTS: Primary | ICD-10-CM

## 2025-03-18 DIAGNOSIS — M79.644 BILATERAL THUMB PAIN: ICD-10-CM

## 2025-03-18 DIAGNOSIS — M79.642 BILATERAL HAND PAIN: ICD-10-CM

## 2025-03-18 DIAGNOSIS — M79.642 LEFT HAND PAIN: ICD-10-CM

## 2025-03-18 DIAGNOSIS — M79.645 BILATERAL THUMB PAIN: ICD-10-CM

## 2025-03-18 DIAGNOSIS — M79.641 BILATERAL HAND PAIN: ICD-10-CM

## 2025-03-18 PROCEDURE — 99214 OFFICE O/P EST MOD 30 MIN: CPT | Performed by: SPECIALIST

## 2025-03-18 PROCEDURE — 76882 US LMTD JT/FCL EVL NVASC XTR: CPT | Performed by: SPECIALIST

## 2025-03-18 PROCEDURE — 73130 X-RAY EXAM OF HAND: CPT | Mod: 50

## 2025-03-18 PROCEDURE — 1036F TOBACCO NON-USER: CPT | Performed by: SPECIALIST

## 2025-03-18 PROCEDURE — 20604 DRAIN/INJ JOINT/BURSA W/US: CPT | Performed by: SPECIALIST

## 2025-03-18 RX ORDER — FLUTICASONE PROPIONATE 50 MCG
1 SPRAY, SUSPENSION (ML) NASAL DAILY
COMMUNITY

## 2025-03-18 RX ORDER — MELOXICAM 15 MG/1
15 TABLET ORAL DAILY PRN
Qty: 30 TABLET | Refills: 1 | Status: SHIPPED | OUTPATIENT
Start: 2025-03-18

## 2025-03-18 ASSESSMENT — PAIN DESCRIPTION - DESCRIPTORS
DESCRIPTORS: ACHING
DESCRIPTORS: ACHING

## 2025-03-18 ASSESSMENT — PAIN - FUNCTIONAL ASSESSMENT: PAIN_FUNCTIONAL_ASSESSMENT: 0-10

## 2025-03-18 ASSESSMENT — PAIN SCALES - GENERAL
PAINLEVEL_OUTOF10: 8
PAINLEVEL_OUTOF10: 3

## 2025-03-18 NOTE — PROGRESS NOTES
"Assessment/Plan   Encounter Diagnoses:  Primary osteoarthritis of both first carpometacarpal joints    Left hand pain    Bilateral hand pain    Bilateral thumb pain  Primary osteoarthritis of both first carpometacarpal joints  Assessment: Bilateral thumb CMC arthritis left greater than right.  Patient is left-hand dominant.  Her last cortisone injection was 1 year ago.  She is requesting refill of meloxicam.    Plan:  The left CMC joint was injected with 4 mg of dexamethasone and some lidocaine/Marcaine under ultrasound control.  Follow-up in a year for reevaluation or sooner if her symptoms worsen.  Ultrasound of the right thumb was also performed today.  Mobic 15 mg 30 tablets with 1 refill to be used on days when her thumbs are more symptomatic.  Occupational Therapy she has a home program which includes exercise and bracing.  Activity restrictions we once again discussed this.       Subjective    Patient ID: Mayra Mcclure \"Merrick" is a 64 y.o. female.    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  She got great relief from CMC joint injections.  These were last performed 9 months to a year ago.  She comes in today stating that the left thumb is more aggravated and she wishes to consider a booster shot.    OBJECTIVE: ORTHO EXAM  Left thumb  Tender at the CMC joint.  Compression range of motion is with crepitance and pain.  Distraction range of motion is without crepitance and much less painful.  Shouldering of the thumb i.e. lateral subluxation of the metacarpal base is noted.  Pinch  is painful.  And also weak.  She is neurovascularly intact distally.  After her injection many of her symptoms resolved.  She had an excellent lidocaine suppression " test.    Right thumb exam  Less tender but  at the CMC joint.  Compression test with crepitation and pain with distraction test  Improvement noted.  Some shouldering of the thumb with lateral subluxation of the metacarpal base.  This was easily reducible.  Pinch  painful but less than the left side.  Neurovascularly intact distally.    IMAGE RESULTS:  Point of Care Ultrasound  These images are not reportable by radiology and will not be interpreted   by  Radiologists.      ULTRASOUND        Wrist Ultrasound    DIAGNOSTIC ULTRASOUND REPORT FINAL: Right HAND AND WRIST  Sonographer: Jassi Cochran MD  Procedure: Ultrasound, extremity, nonvascular, real-time, COMPLETE, anatomic specific.  Indication: Wrist Pain  Technique: B-Mode Ultrasound Examination performed using 8-13 MHz linear transducer with [employer]  STUDY TYPE:  1. ULTRASOUND EXTREMITY  2. REAL TIME WITH IMAGE DOCUMENTATION  3. NON-VASCULAR  4. COMPLETE STUDY  Site: LEFT HAND AND WRIST  Live ultrasound was performed with the patient's HAND AND WRIST and PERMANENTLY documented. COMPLETE and FINAL ULTRASOUND REPORT of the patient's  HAND AND WRIST. . I personally performed the ultrasound and reviewed the findings. These show:    Live ultrasound was performed of the patient's HAND AND WRIST that shows arthritic changes at the CMC joint of the thumb.  Some lateral subluxation of the metacarpal base is seen.  This was reducible.  Some but less joint space narrowing than the contralateral was noted.      S Inj/Asp: L thumb CMC on 3/18/2025 9:02 AM  Indications: pain  Details: ultrasound-guided lateral approach  Medications: 4 mg dexAMETHasone 4 mg/mL  Procedure, treatment alternatives, risks and benefits explained, specific risks discussed. Consent was given by the patient. Immediately prior to procedure a time out was called to verify the correct patient, procedure, equipment, support staff and site/side marked as required. Patient was prepped  and draped in the usual sterile fashion.            Orders Placed This Encounter    Point of Care Ultrasound    XR hand 3+ views bilateral

## 2025-03-18 NOTE — ASSESSMENT & PLAN NOTE
Assessment: Bilateral thumb CMC arthritis left greater than right.  Patient is left-hand dominant.  Her last cortisone injection was 1 year ago.  She is requesting refill of meloxicam.    Plan:  The left CMC joint was injected with 4 mg of dexamethasone and some lidocaine/Marcaine under ultrasound control.  Follow-up in a year for reevaluation or sooner if her symptoms worsen.  Ultrasound of the right thumb was also performed today.  Mobic 15 mg 30 tablets with 1 refill to be used on days when her thumbs are more symptomatic.  Occupational Therapy she has a home program which includes exercise and bracing.  Activity restrictions we once again discussed this.

## 2025-05-13 ENCOUNTER — APPOINTMENT (OUTPATIENT)
Age: 65
End: 2025-05-13
Payer: COMMERCIAL

## 2025-05-13 VITALS
SYSTOLIC BLOOD PRESSURE: 120 MMHG | DIASTOLIC BLOOD PRESSURE: 80 MMHG | HEART RATE: 72 BPM | BODY MASS INDEX: 21.38 KG/M2 | WEIGHT: 133 LBS | HEIGHT: 66 IN

## 2025-05-13 DIAGNOSIS — N39.0 RECURRENT UTI: ICD-10-CM

## 2025-05-13 DIAGNOSIS — J30.89 ENVIRONMENTAL AND SEASONAL ALLERGIES: Primary | ICD-10-CM

## 2025-05-13 DIAGNOSIS — I10 PRIMARY HYPERTENSION: ICD-10-CM

## 2025-05-13 DIAGNOSIS — Z12.31 SCREENING MAMMOGRAM FOR BREAST CANCER: ICD-10-CM

## 2025-05-13 DIAGNOSIS — F41.9 ANXIETY: ICD-10-CM

## 2025-05-13 PROBLEM — M79.641 BILATERAL HAND PAIN: Status: RESOLVED | Noted: 2024-03-26 | Resolved: 2025-05-13

## 2025-05-13 PROBLEM — M79.642 BILATERAL HAND PAIN: Status: RESOLVED | Noted: 2024-03-26 | Resolved: 2025-05-13

## 2025-05-13 PROBLEM — M18.0 PRIMARY OSTEOARTHRITIS OF BOTH FIRST CARPOMETACARPAL JOINTS: Status: RESOLVED | Noted: 2025-03-18 | Resolved: 2025-05-13

## 2025-05-13 LAB
ALBUMIN SERPL-MCNC: 4.7 G/DL (ref 3.6–5.1)
ALP SERPL-CCNC: 63 U/L (ref 37–153)
ALT SERPL-CCNC: 15 U/L (ref 6–29)
ANION GAP SERPL CALCULATED.4IONS-SCNC: 9 MMOL/L (CALC) (ref 7–17)
AST SERPL-CCNC: 18 U/L (ref 10–35)
BASOPHILS # BLD AUTO: 111 CELLS/UL (ref 0–200)
BASOPHILS NFR BLD AUTO: 2.1 %
BILIRUB SERPL-MCNC: 0.5 MG/DL (ref 0.2–1.2)
BUN SERPL-MCNC: 24 MG/DL (ref 7–25)
CALCIUM SERPL-MCNC: 9.1 MG/DL (ref 8.6–10.4)
CHLORIDE SERPL-SCNC: 105 MMOL/L (ref 98–110)
CHOLEST SERPL-MCNC: 224 MG/DL
CHOLEST/HDLC SERPL: 3.6 (CALC)
CO2 SERPL-SCNC: 28 MMOL/L (ref 20–32)
CREAT SERPL-MCNC: 0.94 MG/DL (ref 0.5–1.05)
EGFRCR SERPLBLD CKD-EPI 2021: 68 ML/MIN/1.73M2
EOSINOPHIL # BLD AUTO: 80 CELLS/UL (ref 15–500)
EOSINOPHIL NFR BLD AUTO: 1.5 %
ERYTHROCYTE [DISTWIDTH] IN BLOOD BY AUTOMATED COUNT: 13.1 % (ref 11–15)
GLUCOSE SERPL-MCNC: 89 MG/DL (ref 65–99)
HCT VFR BLD AUTO: 43.7 % (ref 35–45)
HDLC SERPL-MCNC: 62 MG/DL
HGB BLD-MCNC: 14.3 G/DL (ref 11.7–15.5)
LDLC SERPL CALC-MCNC: 141 MG/DL (CALC)
LYMPHOCYTES # BLD AUTO: 1007 CELLS/UL (ref 850–3900)
LYMPHOCYTES NFR BLD AUTO: 19 %
MCH RBC QN AUTO: 30 PG (ref 27–33)
MCHC RBC AUTO-ENTMCNC: 32.7 G/DL (ref 32–36)
MCV RBC AUTO: 91.6 FL (ref 80–100)
MONOCYTES # BLD AUTO: 371 CELLS/UL (ref 200–950)
MONOCYTES NFR BLD AUTO: 7 %
NEUTROPHILS # BLD AUTO: 3731 CELLS/UL (ref 1500–7800)
NEUTROPHILS NFR BLD AUTO: 70.4 %
NONHDLC SERPL-MCNC: 162 MG/DL (CALC)
PLATELET # BLD AUTO: 177 THOUSAND/UL (ref 140–400)
PMV BLD REES-ECKER: 9.9 FL (ref 7.5–12.5)
POTASSIUM SERPL-SCNC: 3.7 MMOL/L (ref 3.5–5.3)
PROT SERPL-MCNC: 6.7 G/DL (ref 6.1–8.1)
RBC # BLD AUTO: 4.77 MILLION/UL (ref 3.8–5.1)
SODIUM SERPL-SCNC: 142 MMOL/L (ref 135–146)
TRIGL SERPL-MCNC: 101 MG/DL
TSH SERPL-ACNC: 1.97 MIU/L (ref 0.4–4.5)
VIT B12 SERPL-MCNC: 1919 PG/ML (ref 200–1100)
WBC # BLD AUTO: 5.3 THOUSAND/UL (ref 3.8–10.8)

## 2025-05-13 PROCEDURE — 3079F DIAST BP 80-89 MM HG: CPT | Performed by: FAMILY MEDICINE

## 2025-05-13 PROCEDURE — 3008F BODY MASS INDEX DOCD: CPT | Performed by: FAMILY MEDICINE

## 2025-05-13 PROCEDURE — 99214 OFFICE O/P EST MOD 30 MIN: CPT | Performed by: FAMILY MEDICINE

## 2025-05-13 PROCEDURE — 1036F TOBACCO NON-USER: CPT | Performed by: FAMILY MEDICINE

## 2025-05-13 PROCEDURE — 3074F SYST BP LT 130 MM HG: CPT | Performed by: FAMILY MEDICINE

## 2025-05-13 RX ORDER — ESTRADIOL 0.1 MG/G
CREAM VAGINAL
Qty: 42.5 G | Refills: 1 | Status: SHIPPED | OUTPATIENT
Start: 2025-05-13

## 2025-05-13 NOTE — PROGRESS NOTES
"Patient presents for periodic surveillance of chronic medical problems.     Augie Mcclure \"Araceli\" is a 64 y.o. female who presents for Annual Exam.  HPI  Requesting an inhaler, used a maintenance inhaler when she lived in [place] estimate 8 years ago, prescribed by an allergist.  Main symptom was postnasal drainage at the time.  States always having to clear her throat.  Slight cough. Taking otc equate allergy.  Not using singulair regularly. Recommend restart the flonase first  Htn, stable  Reviewed labs, b12 high.   Feels like falling more, tripping more.  Balance not what it was.  Every month or two she trips.  Gets regular exercise.  Declines PT for now.   Will monitor  Saw cardiology for aortic ectasia 2024, they suggested one year check up, she will call and schedule  Recurrent utis, uses topical estrogen cream  Chronic neck pain, stable  Due for mammogram.  Had colonoscopy 2020, ten year surveillance advised  S/p hysterectomy for prolapse, pap smear not indicated      Review of Systems   All other systems reviewed and are negative.  .    Allergies[1]    Medications Ordered Prior to Encounter[2]    Problem List[3]    Objective     Visit Vitals  /80 (BP Location: Left arm, Patient Position: Sitting)   Pulse 72      Physical Exam  Vitals and nursing note reviewed.   Constitutional:       General: She is not in acute distress.     Appearance: Normal appearance. She is not toxic-appearing.   HENT:      Head: Normocephalic and atraumatic.   Cardiovascular:      Rate and Rhythm: Normal rate and regular rhythm.      Heart sounds: No murmur heard.  Pulmonary:      Effort: Pulmonary effort is normal.      Breath sounds: Normal breath sounds.   Musculoskeletal:      Cervical back: Neck supple. No rigidity.      Comments:     Skin:     General: Skin is warm and dry.   Neurological:      General: No focal deficit present.      Mental Status: She is alert and oriented to person, place, and time. "   Psychiatric:         Mood and Affect: Mood normal.         Behavior: Behavior normal.       Orders Only on 05/12/2025   Component Date Value Ref Range Status    CHOLESTEROL, TOTAL 05/12/2025 224 (H)  <200 mg/dL Final    HDL CHOLESTEROL 05/12/2025 62  > OR = 50 mg/dL Final    TRIGLYCERIDES 05/12/2025 101  <150 mg/dL Final    LDL-CHOLESTEROL 05/12/2025 141 (H)  mg/dL (calc) Final    Comment: Reference range: <100     Desirable range <100 mg/dL for primary prevention;    <70 mg/dL for patients with CHD or diabetic patients   with > or = 2 CHD risk factors.     LDL-C is now calculated using the Omer   calculation, which is a validated novel method providing   better accuracy than the Friedewald equation in the   estimation of LDL-C.   Tony SS et al. NICOLASA. 2013;310(19): 4336-8271   (http://education.[employer].Toutpost/faq/JPK353)      CHOL/HDLC RATIO 05/12/2025 3.6  <5.0 (calc) Final    NON HDL CHOLESTEROL 05/12/2025 162 (H)  <130 mg/dL (calc) Final    Comment: For patients with diabetes plus 1 major ASCVD risk   factor, treating to a non-HDL-C goal of <100 mg/dL   (LDL-C of <70 mg/dL) is considered a therapeutic   option.      GLUCOSE 05/12/2025 89  65 - 99 mg/dL Final    Comment:               Fasting reference interval         UREA NITROGEN (BUN) 05/12/2025 24  7 - 25 mg/dL Final    CREATININE 05/12/2025 0.94  0.50 - 1.05 mg/dL Final    EGFR 05/12/2025 68  > OR = 60 mL/min/1.73m2 Final    SODIUM 05/12/2025 142  135 - 146 mmol/L Final    POTASSIUM 05/12/2025 3.7  3.5 - 5.3 mmol/L Final    CHLORIDE 05/12/2025 105  98 - 110 mmol/L Final    CARBON DIOXIDE 05/12/2025 28  20 - 32 mmol/L Final    ELECTROLYTE BALANCE 05/12/2025 9  7 - 17 mmol/L (calc) Final    CALCIUM 05/12/2025 9.1  8.6 - 10.4 mg/dL Final    PROTEIN, TOTAL 05/12/2025 6.7  6.1 - 8.1 g/dL Final    ALBUMIN 05/12/2025 4.7  3.6 - 5.1 g/dL Final    BILIRUBIN, TOTAL 05/12/2025 0.5  0.2 - 1.2 mg/dL Final    ALKALINE PHOSPHATASE 05/12/2025 63  37 -  153 U/L Final    AST 05/12/2025 18  10 - 35 U/L Final    ALT 05/12/2025 15  6 - 29 U/L Final    WHITE BLOOD CELL COUNT 05/12/2025 5.3  3.8 - 10.8 Thousand/uL Final    RED BLOOD CELL COUNT 05/12/2025 4.77  3.80 - 5.10 Million/uL Final    HEMOGLOBIN 05/12/2025 14.3  11.7 - 15.5 g/dL Final    HEMATOCRIT 05/12/2025 43.7  35.0 - 45.0 % Final    MCV 05/12/2025 91.6  80.0 - 100.0 fL Final    MCH 05/12/2025 30.0  27.0 - 33.0 pg Final    MCHC 05/12/2025 32.7  32.0 - 36.0 g/dL Final    Comment: For adults, a slight decrease in the calculated MCHC  value (in the range of 30 to 32 g/dL) is most likely  not clinically significant; however, it should be  interpreted with caution in correlation with other  red cell parameters and the patient's clinical  condition.      RDW 05/12/2025 13.1  11.0 - 15.0 % Final    PLATELET COUNT 05/12/2025 177  140 - 400 Thousand/uL Final    MPV 05/12/2025 9.9  7.5 - 12.5 fL Final    ABSOLUTE NEUTROPHILS 05/12/2025 3,731  1,500 - 7,800 cells/uL Final    ABSOLUTE LYMPHOCYTES 05/12/2025 1,007  850 - 3,900 cells/uL Final    ABSOLUTE MONOCYTES 05/12/2025 371  200 - 950 cells/uL Final    ABSOLUTE EOSINOPHILS 05/12/2025 80  15 - 500 cells/uL Final    ABSOLUTE BASOPHILS 05/12/2025 111  0 - 200 cells/uL Final    NEUTROPHILS 05/12/2025 70.4  % Final    LYMPHOCYTES 05/12/2025 19.0  % Final    MONOCYTES 05/12/2025 7.0  % Final    EOSINOPHILS 05/12/2025 1.5  % Final    BASOPHILS 05/12/2025 2.1  % Final    VITAMIN B12 05/12/2025 1,919 (H)  200 - 1,100 pg/mL Final    TSH W/REFLEX TO FT4 05/12/2025 1.97  0.40 - 4.50 mIU/L Final         Assessment/Plan   Problem List Items Addressed This Visit       Anxiety    Hypertension    Recurrent UTI    Environmental and seasonal allergies - Primary     Other Visit Diagnoses         Screening mammogram for breast cancer        Relevant Orders    BI mammo bilateral screening tomosynthesis             Follow up six months, no labs  Resume daily flonase, if not helping with  drainage consider adding singular as well. Continue over the counter allergy pill.       Bee Valenzuela MD          [1] No Known Allergies  [2]   Current Outpatient Medications on File Prior to Visit   Medication Sig Dispense Refill    artificial tears, dextran-hypomel-glycerin, 0.1-0.3-0.2 % ophthalmic solution Administer into affected eye(s) 4 times a day.      calcium carbonate-vitamin D3 600 mg-20 mcg (800 unit) tablet Take 1 tablet by mouth once daily.      coffee xt-phosphatidyl serine (Neuriva Original) 100-100 mg capsule Take by mouth.      collagen-biotin-ascorbic acid (Collagen 1500 Plus C) 500 mg-800 mcg- 50 mg capsule Take by mouth.      cranberry extract 200 mg capsule Take by mouth.      diclofenac sodium 1 % kit Apply 1 Application topically 2 times a day. 100 each 1    dilTIAZem (Cardizem) 60 mg immediate release tablet Take 1 tablet (60 mg) by mouth 2 times a day. 180 tablet 1    estradiol (Estrace) 0.01 % (0.1 mg/gram) vaginal cream One toshia vaginally three times week 42.5 g 1    fish oil (Omega-3) 60- mg capsule Fish Oil OIL   Refills: 0       Active      fluticasone (Flonase Allergy Relief) 50 mcg/actuation nasal spray Administer 1 spray into each nostril once daily.      glucosamine-chondroitin 500-400 mg tablet Take 1 tablet by mouth 3 times a day. Take per directed      meloxicam (Mobic) 15 mg tablet Take 1 tablet (15 mg) by mouth once daily as needed (PAIN) for up to 60 doses. 30 tablet 1    multivitamin tablet Take by mouth.      orphenadrine (Norflex) 100 mg 12 hr tablet TAKE 1 TABLET BY MOUTH TWICE A DAY AS NEEDED FOR SPASMS 30 tablet 0    sertraline (Zoloft) 100 mg tablet Take 1 tablet (100 mg) by mouth once daily. 90 tablet 1    tetrahydrozoline (Visine) 0.05 % ophthalmic solution Administer into affected eye(s) 4 times a day.      triamcinolone (Kenalog) 0.1 % ointment Apply topically to affected area 2-3 times a day as needed for itching/rash. Max of 7 days 15 g 0     No current  facility-administered medications on file prior to visit.   [3]   Patient Active Problem List  Diagnosis    Anxiety    Bilateral hearing loss    Cervicalgia    History of hysterectomy    Hypertension    Prolapse of female pelvic organs    Recurrent UTI    Mitral regurgitation    Meningioma (Multi)    Aortic ectasia    Environmental and seasonal allergies

## 2025-05-20 ENCOUNTER — HOSPITAL ENCOUNTER (OUTPATIENT)
Dept: RADIOLOGY | Facility: HOSPITAL | Age: 65
Discharge: HOME | End: 2025-05-20
Payer: COMMERCIAL

## 2025-05-20 VITALS — BODY MASS INDEX: 21.38 KG/M2 | HEIGHT: 66 IN | WEIGHT: 133 LBS

## 2025-05-20 DIAGNOSIS — Z12.31 SCREENING MAMMOGRAM FOR BREAST CANCER: ICD-10-CM

## 2025-05-20 PROCEDURE — 77067 SCR MAMMO BI INCL CAD: CPT | Performed by: RADIOLOGY

## 2025-05-20 PROCEDURE — 77063 BREAST TOMOSYNTHESIS BI: CPT | Performed by: RADIOLOGY

## 2025-05-20 PROCEDURE — 77067 SCR MAMMO BI INCL CAD: CPT

## 2025-05-22 DIAGNOSIS — M79.642 LEFT HAND PAIN: ICD-10-CM

## 2025-05-27 RX ORDER — MELOXICAM 15 MG/1
15 TABLET ORAL DAILY PRN
Qty: 30 TABLET | Refills: 1 | Status: SHIPPED | OUTPATIENT
Start: 2025-05-27

## 2025-06-24 DIAGNOSIS — F41.9 ANXIETY: ICD-10-CM

## 2025-06-24 RX ORDER — SERTRALINE HYDROCHLORIDE 100 MG/1
100 TABLET, FILM COATED ORAL DAILY
Qty: 90 TABLET | Refills: 1 | Status: SHIPPED | OUTPATIENT
Start: 2025-06-24

## 2025-06-26 ENCOUNTER — HOSPITAL ENCOUNTER (OUTPATIENT)
Dept: RADIOLOGY | Facility: EXTERNAL LOCATION | Age: 65
Discharge: HOME | End: 2025-06-26

## 2025-06-26 ENCOUNTER — OFFICE VISIT (OUTPATIENT)
Dept: ORTHOPEDIC SURGERY | Facility: CLINIC | Age: 65
End: 2025-06-26
Payer: COMMERCIAL

## 2025-06-26 DIAGNOSIS — R20.0 NUMBNESS OF LEFT HAND: ICD-10-CM

## 2025-06-26 DIAGNOSIS — M79.641 BILATERAL HAND PAIN: ICD-10-CM

## 2025-06-26 DIAGNOSIS — M79.642 BILATERAL HAND PAIN: ICD-10-CM

## 2025-06-26 PROCEDURE — 1036F TOBACCO NON-USER: CPT | Performed by: SPECIALIST

## 2025-06-26 PROCEDURE — 99214 OFFICE O/P EST MOD 30 MIN: CPT | Performed by: SPECIALIST

## 2025-06-26 ASSESSMENT — PAIN SCALES - GENERAL: PAINLEVEL_OUTOF10: 4

## 2025-06-26 ASSESSMENT — PAIN - FUNCTIONAL ASSESSMENT: PAIN_FUNCTIONAL_ASSESSMENT: 0-10

## 2025-06-26 ASSESSMENT — PAIN DESCRIPTION - DESCRIPTORS: DESCRIPTORS: ACHING;NUMBNESS;TINGLING

## 2025-06-26 NOTE — PROGRESS NOTES
"Assessment/Plan   Encounter Diagnoses:  Bilateral hand pain    Numbness of left hand  Bilateral hand pain  Assessment: Bilateral hand numbness with some left wrist weakness and swelling in the left wrist and hand.    Plan:  Initially obtain a EMG nerve conduction study in the workup for carpal tunnel syndrome and possible cubital tunnel syndrome on the left.  Bilateral testing should be performed as she does have some symptoms on the right.  Continue the evaluation to ascertain why she has the swelling in perception of weakness.  Follow-up in 3 to 4 weeks for reevaluation after the testing.  She has a wrist splint that she wears at night already.  She uses Voltaren gel  She takes a nonsteroidal anti-inflammatory before she goes to bed.       Subjective    Patient ID: Mayra Mcclure \"Merrick" is a 64 y.o. female.    Chief Complaint: Follow-up, Pain, and Numbness of the Left Hand     Last Surgery: No surgery found  Last Surgery Date: No surgery found    HPI  64-year-old female comes in today with bilateral hand pain.  She points to the left wrist and left hand stating she feels she has extra swelling and some weakness in the wrist.  She is also complaining of numbness and tingling in her fingers involving the median nerve distribution and she is less definitive about the little finger in the ulnar border of the ring finger.  This does not awaken her at night but she is already using a cock up wrist splint, nonsteroidal anti-inflammatories and Tylenol at nighttime.    OBJECTIVE: ORTHO EXAM  Left wrist/hand:  No obvious swelling or masses  Thenar eminence muscle mass: Minimal  No atrophy noted of hypothenar eminence   Equivocal Tinel's at carpal tunnel  + Median nerve compression test- Jonas's Test  + Phalen's test  Decreased sensation to light touch and 2 point discrimination in median nerve distribution  Motor exam within normal limits she was able to abduct and adductor with normal strength.  Dorsiflexion and " palmar flexion at the wrist was equal to the right wrist although she is left-hand dominant.  She subjectively feels some weakness in the wrist when she tries to use hand weights at [employer].  Good capillary refill  She has a positive Tinel at the left cubital tunnel.  Her fingers are slightly more swollen than the contralateral side.  She feels she has swelling in the dorsum of her hand but this is hard to objectively assess when bilateral comparison.    Right wrist/hand:  No obvious swelling or masses  Thenar eminence muscle mass: Within normal limits  No atrophy noted of hypothenar eminence   Negative Tinel's at carpal tunnel  + Median nerve compression test- Jonas's Test  + Phalen's test  Decreased sensation to light touch and 2 point discrimination in median nerve distribution  Motor exam within normal limits  Good capillary refill      IMAGE RESULTS:  Point of Care Ultrasound  These images are not reportable by radiology and will not be interpreted   by  Radiologists.      ULTRASOUND  Wrist Ultrasound    DIAGNOSTIC ULTRASOUND REPORT FINAL: Left HAND AND WRIST  Sonographer: Jassi Cochran MD  Procedure: Ultrasound, extremity, nonvascular, real-time, COMPLETE, anatomic specific.  Indication: Wrist Pain  Technique: B-Mode Ultrasound Examination performed using 8-13 MHz linear transducer with [employer] Software  STUDY TYPE:  1. ULTRASOUND EXTREMITY  2. REAL TIME WITH IMAGE DOCUMENTATION  3. NON-VASCULAR  4. COMPLETE STUDY  Site: LEFT HAND AND WRIST  Live ultrasound was performed with the patient's HAND AND WRIST and PERMANENTLY documented. COMPLETE and FINAL ULTRASOUND REPORT of the patient's  HAND AND WRIST. . I personally performed the ultrasound and reviewed the findings. These show:    Live ultrasound was performed of the patient's HAND AND WRIST that shows intact Extensor digitorum communis, Extensor digiti minimi, Extensor indicis proprius, Extensor Pollicus Longus, Flexor Pollicus Longus, Flexor  digitorum profundus, Flexor digitorum superficialis tendon with the THENAR and HYPOTHENAR muscle fibers showing normal striations. NO FLUID NOTED WITHIN THE CARPAL TUNNEL, THE MEDIAN NERVE SHOWS NORMAL PATTERN OF ECHOGENICITY. The median n. remains swollen.  The tendons appear normal in appearance and size as they pass the styloid process. No fracture is appreciated. Nonsterile gloves were used for this procedure  gauze pads were then used to clean the area after the procedure was compete. The patient tolerated the procedure well.  Some CMC joint arthritis is noted.  The median nerve is slightly swollen with an echogenic halo.  It flattens out somewhat at the entrance to the carpal canal.  Procedures     Orders Placed This Encounter    Point of Care Ultrasound    XR hand 3+ views bilateral    EMG & nerve conduction

## 2025-06-26 NOTE — ASSESSMENT & PLAN NOTE
Assessment: Bilateral hand numbness with some left wrist weakness and swelling in the left wrist and hand.    Plan:  Initially obtain a EMG nerve conduction study in the workup for carpal tunnel syndrome and possible cubital tunnel syndrome on the left.  Bilateral testing should be performed as she does have some symptoms on the right.  Continue the evaluation to ascertain why she has the swelling in perception of weakness.  Follow-up in 3 to 4 weeks for reevaluation after the testing.  She has a wrist splint that she wears at night already.  She uses Voltaren gel  She takes a nonsteroidal anti-inflammatory before she goes to bed.

## 2025-07-25 ENCOUNTER — OFFICE VISIT (OUTPATIENT)
Dept: ORTHOPEDIC SURGERY | Facility: CLINIC | Age: 65
End: 2025-07-25
Payer: COMMERCIAL

## 2025-07-25 DIAGNOSIS — R20.0 NUMBNESS OF LEFT HAND: Primary | ICD-10-CM

## 2025-07-25 PROCEDURE — 1036F TOBACCO NON-USER: CPT | Performed by: SPECIALIST

## 2025-07-25 PROCEDURE — 99214 OFFICE O/P EST MOD 30 MIN: CPT | Performed by: SPECIALIST

## 2025-07-25 ASSESSMENT — PAIN - FUNCTIONAL ASSESSMENT: PAIN_FUNCTIONAL_ASSESSMENT: 0-10

## 2025-07-25 NOTE — ASSESSMENT & PLAN NOTE
Left wrist carpal tunnel syndrome    Plan:  I recommended carpal tunnel release.  She wishes to proceed with this.  However she is not prepared to determine the surgery date.  She will call the office.  She may be changing from her private insurance to Medicare and this is a factor in her decision making.  She understands that the longer the nerve is compressed the more intrinsic damage there is to the median nerve.  I advised her that she should try to have her surgery as soon as possible and preferably before the end of the year.  She understands the risks and benefits of the proposed procedure  And she understands the concern over waiting.      Patient was advised of the risks, benefits, alternatives and complications of a carpal tunnel release.   The patient agreed to proceed. The patient was informed of the risk of poor healing, infection, nerve and blood vessel injury, persistence of numbness and other symptoms and rarely nerve or tendon injury. The patient agreed to proceed.

## 2025-07-25 NOTE — PROGRESS NOTES
"Assessment/Plan   Encounter Diagnoses:  No diagnosis found.  Numbness of left hand  Left wrist carpal tunnel syndrome    Plan:  I recommended carpal tunnel release.  She wishes to proceed with this.  However she is not prepared to determine the surgery date.  She will call the office.  She may be changing from her private insurance to Medicare and this is a factor in her decision making.  She understands that the longer the nerve is compressed the more intrinsic damage there is to the median nerve.  I advised her that she should try to have her surgery as soon as possible and preferably before the end of the year.  She understands the risks and benefits of the proposed procedure  And she understands the concern over waiting.      Patient was advised of the risks, benefits, alternatives and complications of a carpal tunnel release.   The patient agreed to proceed. The patient was informed of the risk of poor healing, infection, nerve and blood vessel injury, persistence of numbness and other symptoms and rarely nerve or tendon injury. The patient agreed to proceed.     I reviewed the results of the EMG nerve conduction study.  This is consistent with an isolated left carpal tunnel syndrome.  Subjective    Patient ID: Mayra Mcclure \"Merrick" is a 64 y.o. female.    Chief Complaint: Follow-up of the Left Wrist     Last Surgery: No surgery found  Last Surgery Date: No surgery found    HPI  Left wrist/hand:  No obvious swelling or masses  Thenar eminence muscle mass: Within normal limits  No atrophy noted of hypothenar eminence   Equivocal Tinel's at carpal tunnel  + Median nerve compression test- Jonas's Test  + Phalen's test  Decreased sensation to light touch and 2 point discrimination in median nerve distribution she splits the ring finger.  Motor exam within normal limits  Good capillary refill        IMAGE RESULTS:  Point of Care Ultrasound  These images are not reportable by radiology and will not be " interpreted   by  Radiologists.      ULTRASOUND      Procedures     No orders of the defined types were placed in this encounter.      The contents of this note were dictated into the dragon software.  Sometimes errors in wording, punctuation, or spelling are seen.

## 2025-07-26 DIAGNOSIS — M79.642 LEFT HAND PAIN: ICD-10-CM

## 2025-07-26 RX ORDER — MELOXICAM 15 MG/1
15 TABLET ORAL DAILY PRN
Qty: 30 TABLET | Refills: 1 | Status: SHIPPED | OUTPATIENT
Start: 2025-07-26

## 2025-07-29 ENCOUNTER — APPOINTMENT (OUTPATIENT)
Age: 65
End: 2025-07-29
Payer: COMMERCIAL

## 2025-07-29 VITALS
DIASTOLIC BLOOD PRESSURE: 80 MMHG | SYSTOLIC BLOOD PRESSURE: 132 MMHG | HEART RATE: 64 BPM | HEIGHT: 66 IN | BODY MASS INDEX: 21.38 KG/M2 | WEIGHT: 133 LBS

## 2025-07-29 DIAGNOSIS — K21.9 GASTROESOPHAGEAL REFLUX DISEASE WITHOUT ESOPHAGITIS: ICD-10-CM

## 2025-07-29 DIAGNOSIS — R10.13 DYSPEPSIA: Primary | ICD-10-CM

## 2025-07-29 DIAGNOSIS — R07.89 ATYPICAL CHEST PAIN: ICD-10-CM

## 2025-07-29 PROCEDURE — 1036F TOBACCO NON-USER: CPT | Performed by: FAMILY MEDICINE

## 2025-07-29 PROCEDURE — 3079F DIAST BP 80-89 MM HG: CPT | Performed by: FAMILY MEDICINE

## 2025-07-29 PROCEDURE — 99214 OFFICE O/P EST MOD 30 MIN: CPT | Performed by: FAMILY MEDICINE

## 2025-07-29 PROCEDURE — 3008F BODY MASS INDEX DOCD: CPT | Performed by: FAMILY MEDICINE

## 2025-07-29 PROCEDURE — 3075F SYST BP GE 130 - 139MM HG: CPT | Performed by: FAMILY MEDICINE

## 2025-07-29 RX ORDER — FAMOTIDINE 40 MG/1
40 TABLET, FILM COATED ORAL NIGHTLY
Qty: 30 TABLET | Refills: 1 | Status: SHIPPED | OUTPATIENT
Start: 2025-07-29

## 2025-07-29 ASSESSMENT — PATIENT HEALTH QUESTIONNAIRE - PHQ9
1. LITTLE INTEREST OR PLEASURE IN DOING THINGS: NOT AT ALL
SUM OF ALL RESPONSES TO PHQ9 QUESTIONS 1 AND 2: 0
2. FEELING DOWN, DEPRESSED OR HOPELESS: NOT AT ALL
SUM OF ALL RESPONSES TO PHQ9 QUESTIONS 1 AND 2: 0
2. FEELING DOWN, DEPRESSED OR HOPELESS: NOT AT ALL
1. LITTLE INTEREST OR PLEASURE IN DOING THINGS: NOT AT ALL

## 2025-07-29 NOTE — PROGRESS NOTES
"Patient presents for periodic surveillance of chronic medical problems.     Subjective  Mayra Mcclure \"Merrick" is a 64 y.o. female who presents for Abdominal Pain.  HPI  Saturday before last she had 10-15 minutes of pain in her mid breast bone.  States since then she has had mild discomfort in chest area.  Was not brought on by exertion. Was not short of breath, was not nauseous.   Had eaten lunch at a restaurant a little while prior.   Few days prior had abdominal bloating.  Had intermittent abdominal pain that day.   Over the past month or so she has been taking a lot of tums, gaviscon .  She stopped ibuprofen which she had been taking regularly. Has not been taking meloxicam.    Has been able to exert herself since this and had no symptoms.   Seeing Dr Cochran for carpal tunnel. Planning surgery this fall.   Has noted some constipation, taking otc stool softeners.  Colonoscopy 2020.    Review of Systems   All other systems reviewed and are negative.  .    Allergies[1]    Medications Ordered Prior to Encounter[2]    Problem List[3]    Objective     Visit Vitals  /80 (BP Location: Left arm, Patient Position: Sitting)   Pulse 64      Physical Exam  Vitals and nursing note reviewed.   Constitutional:       General: She is not in acute distress.     Appearance: Normal appearance. She is not toxic-appearing.   HENT:      Head: Normocephalic and atraumatic.     Cardiovascular:      Rate and Rhythm: Normal rate and regular rhythm.      Heart sounds: No murmur heard.  Pulmonary:      Effort: Pulmonary effort is normal.      Breath sounds: Normal breath sounds.     Musculoskeletal:      Cervical back: Neck supple. No rigidity.      Comments:       Skin:     General: Skin is warm and dry.     Neurological:      General: No focal deficit present.      Mental Status: She is alert and oriented to person, place, and time.     Psychiatric:         Mood and Affect: Mood normal.         Behavior: Behavior normal. "         Assessment/Plan   Problem List Items Addressed This Visit    None  Visit Diagnoses         Dyspepsia    -  Primary    Relevant Medications    famotidine (Pepcid) 40 mg tablet      Atypical chest pain          Gastroesophageal reflux disease without esophagitis        Relevant Medications    famotidine (Pepcid) 40 mg tablet               Unlikely cardiac, not exertional, happened after eating , trial of pepcid for six to eight weeks. Call concerns.     Bee Valenzuela MD          [1] No Known Allergies  [2]   Current Outpatient Medications on File Prior to Visit   Medication Sig Dispense Refill    artificial tears, dextran-hypomel-glycerin, 0.1-0.3-0.2 % ophthalmic solution Administer into affected eye(s) 4 times a day.      calcium carbonate-vitamin D3 600 mg-20 mcg (800 unit) tablet Take 1 tablet by mouth once daily.      coffee xt-phosphatidyl serine (Neuriva Original) 100-100 mg capsule Take by mouth.      collagen-biotin-ascorbic acid (Collagen 1500 Plus C) 500 mg-800 mcg- 50 mg capsule Take by mouth.      cranberry extract 200 mg capsule Take by mouth.      diclofenac sodium 1 % kit Apply 1 Application topically 2 times a day. 100 each 1    dilTIAZem (Cardizem) 60 mg immediate release tablet Take 1 tablet (60 mg) by mouth 2 times a day. 180 tablet 1    estradiol (Estrace) 0.01 % (0.1 mg/gram) vaginal cream One toshia vaginally three times week 42.5 g 1    fish oil (Omega-3) 60- mg capsule Fish Oil OIL   Refills: 0       Active      fluticasone (Flonase Allergy Relief) 50 mcg/actuation nasal spray Administer 1 spray into each nostril once daily.      glucosamine-chondroitin 500-400 mg tablet Take 1 tablet by mouth 3 times a day. Take per directed      multivitamin tablet Take by mouth.      orphenadrine (Norflex) 100 mg 12 hr tablet TAKE 1 TABLET BY MOUTH TWICE A DAY AS NEEDED FOR SPASMS 30 tablet 0    sertraline (Zoloft) 100 mg tablet Take 1 tablet (100 mg) by mouth once daily. 90 tablet 1     tetrahydrozoline (Visine) 0.05 % ophthalmic solution Administer into affected eye(s) 4 times a day.      triamcinolone (Kenalog) 0.1 % ointment Apply topically to affected area 2-3 times a day as needed for itching/rash. Max of 7 days 15 g 0    [DISCONTINUED] meloxicam (Mobic) 15 mg tablet TAKE 1 TABLET (15 MG) BY MOUTH ONCE DAILY AS NEEDED (PAIN) FOR UP TO 60 DOSES. 30 tablet 1    [DISCONTINUED] meloxicam (Mobic) 15 mg tablet TAKE 1 TABLET (15 MG) BY MOUTH ONCE DAILY AS NEEDED (PAIN) FOR UP TO 60 DOSES. 30 tablet 1     No current facility-administered medications on file prior to visit.   [3]   Patient Active Problem List  Diagnosis    Anxiety    Bilateral hearing loss    Cervicalgia    History of hysterectomy    Hypertension    Prolapse of female pelvic organs    Recurrent UTI    Mitral regurgitation    Meningioma (Multi)    Aortic ectasia    Bilateral hand pain    Environmental and seasonal allergies    Numbness of left hand

## 2025-07-31 DIAGNOSIS — Z00.00 ENCOUNTER FOR GENERAL ADULT MEDICAL EXAMINATION WITHOUT ABNORMAL FINDINGS: ICD-10-CM

## 2025-08-01 RX ORDER — DILTIAZEM HYDROCHLORIDE 60 MG/1
60 TABLET, FILM COATED ORAL 2 TIMES DAILY
Qty: 180 TABLET | Refills: 1 | Status: SHIPPED | OUTPATIENT
Start: 2025-08-01

## 2025-08-22 DIAGNOSIS — R10.13 DYSPEPSIA: ICD-10-CM

## 2025-08-22 RX ORDER — FAMOTIDINE 40 MG/1
40 TABLET, FILM COATED ORAL NIGHTLY
Qty: 90 TABLET | Refills: 1 | Status: SHIPPED | OUTPATIENT
Start: 2025-08-22

## 2025-08-27 DIAGNOSIS — D32.9 MENINGIOMA (MULTI): Primary | ICD-10-CM

## 2025-09-09 ENCOUNTER — APPOINTMENT (OUTPATIENT)
Age: 65
End: 2025-09-09
Payer: COMMERCIAL

## 2025-09-16 ENCOUNTER — APPOINTMENT (OUTPATIENT)
Age: 65
End: 2025-09-16
Payer: COMMERCIAL

## 2025-11-11 ENCOUNTER — APPOINTMENT (OUTPATIENT)
Age: 65
End: 2025-11-11
Payer: COMMERCIAL